# Patient Record
Sex: FEMALE | Race: ASIAN | NOT HISPANIC OR LATINO
[De-identification: names, ages, dates, MRNs, and addresses within clinical notes are randomized per-mention and may not be internally consistent; named-entity substitution may affect disease eponyms.]

---
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
## 2018-02-06 ENCOUNTER — CHARTING TRANS (OUTPATIENT)
Dept: OTHER | Age: 18
End: 2018-02-06

## 2018-11-02 VITALS
DIASTOLIC BLOOD PRESSURE: 78 MMHG | HEIGHT: 68 IN | BODY MASS INDEX: 35.16 KG/M2 | WEIGHT: 232 LBS | TEMPERATURE: 98.1 F | SYSTOLIC BLOOD PRESSURE: 110 MMHG | RESPIRATION RATE: 14 BRPM | HEART RATE: 92 BPM

## 2018-11-02 VITALS
WEIGHT: 235 LBS | RESPIRATION RATE: 18 BRPM | TEMPERATURE: 98.2 F | OXYGEN SATURATION: 98 % | SYSTOLIC BLOOD PRESSURE: 112 MMHG | DIASTOLIC BLOOD PRESSURE: 76 MMHG | HEART RATE: 102 BPM

## 2018-11-03 VITALS — RESPIRATION RATE: 17 BRPM | WEIGHT: 238 LBS | HEIGHT: 68 IN | BODY MASS INDEX: 36.07 KG/M2 | HEART RATE: 80 BPM

## 2018-11-03 VITALS
HEART RATE: 95 BPM | BODY MASS INDEX: 36.07 KG/M2 | OXYGEN SATURATION: 99 % | SYSTOLIC BLOOD PRESSURE: 120 MMHG | RESPIRATION RATE: 17 BRPM | HEIGHT: 68 IN | WEIGHT: 238 LBS | DIASTOLIC BLOOD PRESSURE: 80 MMHG

## 2018-11-27 VITALS
SYSTOLIC BLOOD PRESSURE: 118 MMHG | DIASTOLIC BLOOD PRESSURE: 78 MMHG | RESPIRATION RATE: 16 BRPM | OXYGEN SATURATION: 98 % | HEART RATE: 81 BPM | TEMPERATURE: 97.8 F

## 2019-01-22 ENCOUNTER — WALK IN (OUTPATIENT)
Dept: URGENT CARE | Age: 19
End: 2019-01-22

## 2019-01-22 DIAGNOSIS — S06.0X0A CONCUSSION WITHOUT LOSS OF CONSCIOUSNESS, INITIAL ENCOUNTER: Primary | ICD-10-CM

## 2019-01-22 DIAGNOSIS — R25.1 SHAKY: ICD-10-CM

## 2019-01-22 PROCEDURE — 99214 OFFICE O/P EST MOD 30 MIN: CPT | Performed by: FAMILY MEDICINE

## 2019-01-22 ASSESSMENT — PAIN SCALES - GENERAL: PAINLEVEL: 7-8

## 2019-02-27 ENCOUNTER — WALK IN (OUTPATIENT)
Dept: URGENT CARE | Age: 19
End: 2019-02-27

## 2019-02-27 DIAGNOSIS — J00 ACUTE NASOPHARYNGITIS: Primary | ICD-10-CM

## 2019-02-27 LAB
INTERNAL PROCEDURAL CONTROLS ACCEPTABLE: YES
S PYO AG THROAT QL IA.RAPID: NEGATIVE

## 2019-02-27 PROCEDURE — 87880 STREP A ASSAY W/OPTIC: CPT | Performed by: NURSE PRACTITIONER

## 2019-02-27 PROCEDURE — 99203 OFFICE O/P NEW LOW 30 MIN: CPT | Performed by: NURSE PRACTITIONER

## 2019-02-27 ASSESSMENT — PAIN SCALES - GENERAL: PAINLEVEL: 5-6

## 2019-03-20 DIAGNOSIS — Z01.84 IMMUNITY TO MEASLES, MUMPS, AND RUBELLA DETERMINED BY SEROLOGIC TEST: ICD-10-CM

## 2019-03-20 DIAGNOSIS — Z01.84 LACK OF IMMUNITY TO HEPATITIS B VIRUS DEMONSTRATED BY SEROLOGIC TEST: ICD-10-CM

## 2019-03-20 DIAGNOSIS — C91.01 ACUTE LYMPHOBLASTIC LEUKEMIA (ALL) IN REMISSION (CMD): Primary | ICD-10-CM

## 2019-03-20 DIAGNOSIS — Z92.21 HISTORY OF ANTINEOPLASTIC CHEMOTHERAPY: ICD-10-CM

## 2019-03-26 DIAGNOSIS — Z92.21 HISTORY OF ANTINEOPLASTIC CHEMOTHERAPY: ICD-10-CM

## 2019-03-26 DIAGNOSIS — R79.89 LOW VITAMIN D LEVEL: ICD-10-CM

## 2019-03-26 DIAGNOSIS — C91.01 ACUTE LYMPHOBLASTIC LEUKEMIA (ALL) IN REMISSION (CMD): Primary | ICD-10-CM

## 2019-03-26 DIAGNOSIS — Z92.241 PERSONAL HISTORY OF SYSTEMIC STEROID THERAPY: ICD-10-CM

## 2019-03-26 DIAGNOSIS — Z79.899 ENCOUNTER FOR MONITORING CARDIOTOXIC DRUG THERAPY: ICD-10-CM

## 2019-03-26 DIAGNOSIS — Z51.81 ENCOUNTER FOR MONITORING CARDIOTOXIC DRUG THERAPY: ICD-10-CM

## 2019-04-08 DIAGNOSIS — Z92.241 PERSONAL HISTORY OF SYSTEMIC STEROID THERAPY: ICD-10-CM

## 2019-04-08 DIAGNOSIS — Z92.21 HISTORY OF ANTINEOPLASTIC THERAPY: ICD-10-CM

## 2019-04-08 DIAGNOSIS — C91.01 ACUTE LYMPHOBLASTIC LEUKEMIA (ALL) IN REMISSION (CMD): Primary | ICD-10-CM

## 2019-09-21 ENCOUNTER — LAB SERVICES (OUTPATIENT)
Dept: LAB | Age: 19
End: 2019-09-21

## 2019-09-21 DIAGNOSIS — R79.89 LOW VITAMIN D LEVEL: ICD-10-CM

## 2019-09-21 DIAGNOSIS — C91.01 ACUTE LYMPHOBLASTIC LEUKEMIA (ALL) IN REMISSION (CMD): Primary | ICD-10-CM

## 2019-09-21 DIAGNOSIS — Z92.241 PERSONAL HISTORY OF SYSTEMIC STEROID THERAPY: ICD-10-CM

## 2019-09-21 DIAGNOSIS — Z79.899 ENCOUNTER FOR MONITORING CARDIOTOXIC DRUG THERAPY: ICD-10-CM

## 2019-09-21 DIAGNOSIS — Z92.21 HISTORY OF ANTINEOPLASTIC CHEMOTHERAPY: ICD-10-CM

## 2019-09-21 DIAGNOSIS — Z51.81 ENCOUNTER FOR MONITORING CARDIOTOXIC DRUG THERAPY: ICD-10-CM

## 2019-09-21 LAB
25(OH)D3+25(OH)D2 SERPL-MCNC: 10 NG/ML (ref 30–100)
APPEARANCE UR: CLEAR
BACTERIA #/AREA URNS HPF: ABNORMAL /HPF
BASOPHILS # BLD AUTO: 0 K/MCL (ref 0–0.3)
BASOPHILS NFR BLD AUTO: 1 %
BILIRUB UR QL STRIP: NEGATIVE
CHOLEST SERPL-MCNC: 143 MG/DL
CHOLEST/HDLC SERPL: 3.4 {RATIO}
COLOR UR: YELLOW
DIFFERENTIAL METHOD BLD: ABNORMAL
EOSINOPHIL # BLD AUTO: 0.1 K/MCL (ref 0.1–0.5)
EOSINOPHIL NFR SPEC: 1 %
ERYTHROCYTE [DISTWIDTH] IN BLOOD: 13.6 % (ref 11–15)
GLUCOSE UR STRIP-MCNC: NEGATIVE MG/DL
HBA1C MFR BLD: 5.2 % (ref 4.5–5.6)
HCT VFR BLD CALC: 36.1 % (ref 36–46.5)
HDLC SERPL-MCNC: 42 MG/DL
HGB BLD-MCNC: 11.5 G/DL (ref 12–15.5)
HGB UR QL STRIP: ABNORMAL
HYALINE CASTS #/AREA URNS LPF: ABNORMAL /LPF (ref 0–5)
IMM GRANULOCYTES # BLD AUTO: 0 K/MCL (ref 0–0.2)
IMM GRANULOCYTES NFR BLD: 0 %
KETONES UR STRIP-MCNC: NEGATIVE MG/DL
LDLC SERPL-MCNC: 91 MG/DL
LENGTH OF FAST TIME PATIENT: 12 HRS
LEUKOCYTE ESTERASE UR QL STRIP: NEGATIVE
LYMPHOCYTES # BLD MANUAL: 1.9 K/MCL (ref 1–4.8)
LYMPHOCYTES NFR BLD MANUAL: 41 %
MAGNESIUM SERPL-MCNC: 2 MG/DL (ref 1.7–2.4)
MCH RBC QN AUTO: 27.6 PG (ref 26–34)
MCHC RBC AUTO-ENTMCNC: 31.9 G/DL (ref 32–36.5)
MCV RBC AUTO: 86.8 FL (ref 78–100)
MONOCYTES # BLD MANUAL: 0.3 K/MCL (ref 0.3–0.9)
MONOCYTES NFR BLD MANUAL: 6 %
MUCOUS THREADS URNS QL MICRO: PRESENT
NEUTROPHILS # BLD: 2.3 K/MCL (ref 1.8–7.7)
NEUTROPHILS NFR BLD AUTO: 51 %
NITRITE UR QL STRIP: NEGATIVE
NONHDLC SERPL-MCNC: 101 MG/DL
NRBC BLD MANUAL-RTO: 0 /100 WBC
NT-PROBNP SERPL-MCNC: 31 PG/ML
PH UR STRIP: 6 UNITS (ref 5–7)
PHOSPHATE SERPL-MCNC: 3.4 MG/DL (ref 2.4–4.7)
PLATELET # BLD: 245 K/MCL (ref 140–450)
PROT UR STRIP-MCNC: NEGATIVE MG/DL
RBC # BLD: 4.16 MIL/MCL (ref 4–5.2)
RBC #/AREA URNS HPF: ABNORMAL /HPF (ref 0–2)
SP GR UR STRIP: 1.02 (ref 1–1.03)
SPECIMEN SOURCE: ABNORMAL
SQUAMOUS #/AREA URNS HPF: ABNORMAL /HPF (ref 0–5)
TRIGL SERPL-MCNC: 50 MG/DL
TSH SERPL-ACNC: 1.52 MCUNITS/ML (ref 0.35–5)
UROBILINOGEN UR STRIP-MCNC: 0.2 MG/DL (ref 0–1)
WBC # BLD: 4.6 K/MCL (ref 4.2–11)
WBC #/AREA URNS HPF: ABNORMAL /HPF (ref 0–5)

## 2019-09-21 PROCEDURE — 86735 MUMPS ANTIBODY: CPT | Performed by: PEDIATRICS

## 2019-09-21 PROCEDURE — 84100 ASSAY OF PHOSPHORUS: CPT | Performed by: PEDIATRICS

## 2019-09-21 PROCEDURE — 86706 HEP B SURFACE ANTIBODY: CPT | Performed by: PEDIATRICS

## 2019-09-21 PROCEDURE — 86762 RUBELLA ANTIBODY: CPT | Performed by: PEDIATRICS

## 2019-09-21 PROCEDURE — 83880 ASSAY OF NATRIURETIC PEPTIDE: CPT | Performed by: PEDIATRICS

## 2019-09-21 PROCEDURE — 84443 ASSAY THYROID STIM HORMONE: CPT | Performed by: PEDIATRICS

## 2019-09-21 PROCEDURE — 83036 HEMOGLOBIN GLYCOSYLATED A1C: CPT | Performed by: PEDIATRICS

## 2019-09-21 PROCEDURE — 85025 COMPLETE CBC W/AUTO DIFF WBC: CPT | Performed by: PEDIATRICS

## 2019-09-21 PROCEDURE — 80061 LIPID PANEL: CPT | Performed by: PEDIATRICS

## 2019-09-21 PROCEDURE — 81001 URINALYSIS AUTO W/SCOPE: CPT | Performed by: PEDIATRICS

## 2019-09-21 PROCEDURE — 82306 VITAMIN D 25 HYDROXY: CPT | Performed by: PEDIATRICS

## 2019-09-21 PROCEDURE — 83735 ASSAY OF MAGNESIUM: CPT | Performed by: PEDIATRICS

## 2019-09-21 PROCEDURE — 36415 COLL VENOUS BLD VENIPUNCTURE: CPT | Performed by: PEDIATRICS

## 2019-09-23 ENCOUNTER — LAB SERVICES (OUTPATIENT)
Dept: LAB | Age: 19
End: 2019-09-23

## 2019-09-23 DIAGNOSIS — Z01.84 LACK OF IMMUNITY TO HEPATITIS B VIRUS DEMONSTRATED BY SEROLOGIC TEST: ICD-10-CM

## 2019-09-23 DIAGNOSIS — Z92.21 HISTORY OF ANTINEOPLASTIC CHEMOTHERAPY: ICD-10-CM

## 2019-09-23 DIAGNOSIS — Z01.84 IMMUNITY TO MEASLES, MUMPS, AND RUBELLA DETERMINED BY SEROLOGIC TEST: ICD-10-CM

## 2019-09-23 DIAGNOSIS — C91.01 ACUTE LYMPHOBLASTIC LEUKEMIA (ALL) IN REMISSION (CMD): ICD-10-CM

## 2019-09-23 LAB
HBV SURFACE AB SER QL: POSITIVE
RUBV IGG SERPL IA-ACNC: 3.3 UNITS/ML

## 2019-09-23 PROCEDURE — 36415 COLL VENOUS BLD VENIPUNCTURE: CPT | Performed by: PEDIATRICS

## 2019-09-23 PROCEDURE — 80053 COMPREHEN METABOLIC PANEL: CPT | Performed by: PEDIATRICS

## 2019-09-23 PROCEDURE — 86762 RUBELLA ANTIBODY: CPT | Performed by: PEDIATRICS

## 2019-09-23 PROCEDURE — 86765 RUBEOLA ANTIBODY: CPT | Performed by: PEDIATRICS

## 2019-09-23 PROCEDURE — 86735 MUMPS ANTIBODY: CPT | Performed by: PEDIATRICS

## 2019-09-23 PROCEDURE — 86706 HEP B SURFACE ANTIBODY: CPT | Performed by: PEDIATRICS

## 2019-09-24 ENCOUNTER — OFFICE VISIT (OUTPATIENT)
Dept: HEMATOLOGY/ONCOLOGY | Age: 19
End: 2019-09-24

## 2019-09-24 VITALS
RESPIRATION RATE: 18 BRPM | TEMPERATURE: 98.2 F | SYSTOLIC BLOOD PRESSURE: 127 MMHG | HEIGHT: 67 IN | BODY MASS INDEX: 35.29 KG/M2 | WEIGHT: 224.87 LBS | DIASTOLIC BLOOD PRESSURE: 82 MMHG | HEART RATE: 86 BPM

## 2019-09-24 DIAGNOSIS — J35.8 TONSIL ASYMMETRY: ICD-10-CM

## 2019-09-24 DIAGNOSIS — R51.9 FREQUENT HEADACHES: ICD-10-CM

## 2019-09-24 DIAGNOSIS — M25.561 PAIN IN BOTH KNEES, UNSPECIFIED CHRONICITY: ICD-10-CM

## 2019-09-24 DIAGNOSIS — E55.9 VITAMIN D DEFICIENCY: ICD-10-CM

## 2019-09-24 DIAGNOSIS — I42.7 CARDIOMYOPATHY DUE TO ANTHRACYCLINE (CMD): ICD-10-CM

## 2019-09-24 DIAGNOSIS — Z23 NEED FOR PROPHYLACTIC VACCINATION WITH MEASLES-MUMPS-RUBELLA (MMR) VACCINE: ICD-10-CM

## 2019-09-24 DIAGNOSIS — Z92.21 PERSONAL HISTORY OF ANTINEOPLASTIC CHEMOTHERAPY: ICD-10-CM

## 2019-09-24 DIAGNOSIS — R25.2 CRAMPING OF HANDS: ICD-10-CM

## 2019-09-24 DIAGNOSIS — M25.562 PAIN IN BOTH KNEES, UNSPECIFIED CHRONICITY: ICD-10-CM

## 2019-09-24 DIAGNOSIS — Z92.241 PERSONAL HISTORY OF SYSTEMIC STEROID THERAPY: ICD-10-CM

## 2019-09-24 DIAGNOSIS — T45.1X5A CARDIOMYOPATHY DUE TO ANTHRACYCLINE (CMD): ICD-10-CM

## 2019-09-24 DIAGNOSIS — I73.00 RAYNAUD'S PHENOMENON WITHOUT GANGRENE: ICD-10-CM

## 2019-09-24 DIAGNOSIS — Z87.42 HISTORY OF MENSTRUAL CRAMPS: ICD-10-CM

## 2019-09-24 DIAGNOSIS — C91.01 ACUTE LYMPHOBLASTIC LEUKEMIA (ALL) IN REMISSION (CMD): Primary | ICD-10-CM

## 2019-09-24 LAB
ALBUMIN SERPL-MCNC: 3.5 G/DL (ref 3.6–5.1)
ALBUMIN/GLOB SERPL: 0.9 {RATIO} (ref 1–2.4)
ALP SERPL-CCNC: 74 UNITS/L (ref 45–117)
ALT SERPL-CCNC: 14 UNITS/L
ANION GAP SERPL CALC-SCNC: 11 MMOL/L (ref 10–20)
AST SERPL-CCNC: 14 UNITS/L
BILIRUB SERPL-MCNC: 0.4 MG/DL (ref 0.2–1)
BUN SERPL-MCNC: 16 MG/DL (ref 6–20)
BUN/CREAT SERPL: 27 (ref 7–25)
CALCIUM SERPL-MCNC: 9.1 MG/DL (ref 8.4–10.2)
CHLORIDE SERPL-SCNC: 110 MMOL/L (ref 98–107)
CO2 SERPL-SCNC: 22 MMOL/L (ref 21–32)
CREAT SERPL-MCNC: 0.59 MG/DL (ref 0.51–0.95)
FASTING STATUS PATIENT QL REPORTED: 12 HRS
GLOBULIN SER-MCNC: 3.8 G/DL (ref 2–4)
GLUCOSE SERPL-MCNC: 83 MG/DL (ref 65–99)
HBV SURFACE AB SER QL: POSITIVE
MUV IGG SER IA-ACNC: 0.63 OD RATIO
MUV IGG SER IA-ACNC: 0.72 OD RATIO
POTASSIUM SERPL-SCNC: 4.3 MMOL/L (ref 3.4–5.1)
PROT SERPL-MCNC: 7.3 G/DL (ref 6.4–8.2)
RUBV IGG SERPL IA-ACNC: 5.2 UNITS/ML
SODIUM SERPL-SCNC: 139 MMOL/L (ref 135–145)

## 2019-09-24 PROCEDURE — 99215 OFFICE O/P EST HI 40 MIN: CPT | Performed by: PEDIATRICS

## 2019-09-24 RX ORDER — CHOLECALCIFEROL (VITAMIN D3) 125 MCG
2000 CAPSULE ORAL DAILY
Qty: 30 TABLET | Refills: 11 | Status: SHIPPED | OUTPATIENT
Start: 2019-09-24 | End: 2023-10-03 | Stop reason: ALTCHOICE

## 2019-09-24 SDOH — HEALTH STABILITY: MENTAL HEALTH: HOW OFTEN DO YOU HAVE A DRINK CONTAINING ALCOHOL?: NEVER

## 2019-09-24 ASSESSMENT — ENCOUNTER SYMPTOMS
ABDOMINAL PAIN: 0
GASTROINTESTINAL NEGATIVE: 1
NUMBNESS: 1
PSYCHIATRIC NEGATIVE: 1
ALLERGIC/IMMUNOLOGIC NEGATIVE: 1
BLOOD IN STOOL: 0
RESPIRATORY NEGATIVE: 1
HEMATOLOGIC/LYMPHATIC NEGATIVE: 1
CONSTITUTIONAL NEGATIVE: 1
EYES NEGATIVE: 1
DIZZINESS: 1
HEADACHES: 1

## 2019-09-25 DIAGNOSIS — Z92.241 PERSONAL HISTORY OF SYSTEMIC STEROID THERAPY: ICD-10-CM

## 2019-09-25 DIAGNOSIS — R79.89 LOW VITAMIN D LEVEL: ICD-10-CM

## 2019-09-25 DIAGNOSIS — Z51.81 ENCOUNTER FOR MONITORING CARDIOTOXIC DRUG THERAPY: ICD-10-CM

## 2019-09-25 DIAGNOSIS — C91.01 ACUTE LYMPHOBLASTIC LEUKEMIA (ALL) IN REMISSION (CMD): Primary | ICD-10-CM

## 2019-09-25 DIAGNOSIS — Z79.899 ENCOUNTER FOR MONITORING CARDIOTOXIC DRUG THERAPY: ICD-10-CM

## 2019-09-25 DIAGNOSIS — Z92.21 PERSONAL HISTORY OF ANTINEOPLASTIC CHEMOTHERAPY: ICD-10-CM

## 2019-09-25 LAB — MEV IGG SER IA-ACNC: ABNORMAL

## 2019-10-02 ENCOUNTER — TELEPHONE (OUTPATIENT)
Dept: HEMATOLOGY/ONCOLOGY | Age: 19
End: 2019-10-02

## 2019-11-14 ENCOUNTER — TELEPHONE (OUTPATIENT)
Dept: HEMATOLOGY/ONCOLOGY | Age: 19
End: 2019-11-14

## 2019-12-21 ENCOUNTER — WALK IN (OUTPATIENT)
Dept: URGENT CARE | Age: 19
End: 2019-12-21

## 2019-12-21 VITALS
BODY MASS INDEX: 34.1 KG/M2 | HEART RATE: 100 BPM | TEMPERATURE: 99.9 F | HEIGHT: 68 IN | RESPIRATION RATE: 18 BRPM | SYSTOLIC BLOOD PRESSURE: 102 MMHG | WEIGHT: 225 LBS | DIASTOLIC BLOOD PRESSURE: 78 MMHG

## 2019-12-21 DIAGNOSIS — J30.9 ALLERGIC RHINITIS, UNSPECIFIED SEASONALITY, UNSPECIFIED TRIGGER: ICD-10-CM

## 2019-12-21 DIAGNOSIS — J00 ACUTE NASOPHARYNGITIS: Primary | ICD-10-CM

## 2019-12-21 PROCEDURE — 99213 OFFICE O/P EST LOW 20 MIN: CPT | Performed by: NURSE PRACTITIONER

## 2019-12-21 RX ORDER — FLUTICASONE PROPIONATE 50 MCG
2 SPRAY, SUSPENSION (ML) NASAL DAILY
Qty: 16 G | Refills: 0 | Status: SHIPPED | OUTPATIENT
Start: 2019-12-21 | End: 2020-11-25 | Stop reason: ALTCHOICE

## 2019-12-21 SDOH — HEALTH STABILITY: MENTAL HEALTH: HOW OFTEN DO YOU HAVE A DRINK CONTAINING ALCOHOL?: NEVER

## 2019-12-21 ASSESSMENT — ENCOUNTER SYMPTOMS
COUGH: 1
EYES NEGATIVE: 1
WHEEZING: 0
HEADACHES: 1
SHORTNESS OF BREATH: 0
DIZZINESS: 0
GASTROINTESTINAL NEGATIVE: 1
ALLERGIC/IMMUNOLOGIC NEGATIVE: 1

## 2019-12-22 ENCOUNTER — TELEPHONE (OUTPATIENT)
Dept: SCHEDULING | Age: 19
End: 2019-12-22

## 2020-06-30 ENCOUNTER — OFFICE VISIT (OUTPATIENT)
Dept: OBGYN | Age: 20
End: 2020-06-30

## 2020-06-30 DIAGNOSIS — L03.317 CELLULITIS OF BUTTOCK: Primary | ICD-10-CM

## 2020-06-30 PROCEDURE — 87205 SMEAR GRAM STAIN: CPT | Performed by: OBSTETRICS & GYNECOLOGY

## 2020-06-30 PROCEDURE — 87075 CULTR BACTERIA EXCEPT BLOOD: CPT | Performed by: OBSTETRICS & GYNECOLOGY

## 2020-06-30 PROCEDURE — 87070 CULTURE OTHR SPECIMN AEROBIC: CPT | Performed by: OBSTETRICS & GYNECOLOGY

## 2020-06-30 PROCEDURE — 99203 OFFICE O/P NEW LOW 30 MIN: CPT | Performed by: OBSTETRICS & GYNECOLOGY

## 2020-06-30 RX ORDER — SULFAMETHOXAZOLE AND TRIMETHOPRIM 800; 160 MG/1; MG/1
1 TABLET ORAL 2 TIMES DAILY
Qty: 20 TABLET | Refills: 0 | Status: SHIPPED | OUTPATIENT
Start: 2020-06-30 | End: 2020-07-10

## 2020-06-30 SDOH — HEALTH STABILITY: MENTAL HEALTH: HOW OFTEN DO YOU HAVE A DRINK CONTAINING ALCOHOL?: NEVER

## 2020-06-30 ASSESSMENT — PAIN SCALES - GENERAL: PAINLEVEL: 5-6

## 2020-07-03 LAB — FINAL REPORT: NORMAL

## 2020-07-06 LAB — FINAL REPORT: NORMAL

## 2020-07-11 ENCOUNTER — E-ADVICE (OUTPATIENT)
Dept: OBGYN | Age: 20
End: 2020-07-11

## 2020-07-13 ENCOUNTER — OFFICE VISIT (OUTPATIENT)
Dept: OBGYN | Age: 20
End: 2020-07-13

## 2020-07-13 ENCOUNTER — APPOINTMENT (OUTPATIENT)
Dept: OBGYN | Age: 20
End: 2020-07-13

## 2020-07-13 DIAGNOSIS — L03.317 CELLULITIS OF BUTTOCK, LEFT: Primary | ICD-10-CM

## 2020-07-13 PROCEDURE — 99214 OFFICE O/P EST MOD 30 MIN: CPT | Performed by: NURSE PRACTITIONER

## 2020-07-13 RX ORDER — CEPHALEXIN 500 MG/1
500 CAPSULE ORAL 2 TIMES DAILY
Qty: 14 CAPSULE | Refills: 0 | Status: SHIPPED | OUTPATIENT
Start: 2020-07-13 | End: 2020-10-12 | Stop reason: ALTCHOICE

## 2020-07-13 SDOH — HEALTH STABILITY: MENTAL HEALTH: HOW OFTEN DO YOU HAVE A DRINK CONTAINING ALCOHOL?: NEVER

## 2020-07-13 ASSESSMENT — PAIN SCALES - GENERAL: PAINLEVEL: 3-4

## 2020-07-15 ENCOUNTER — APPOINTMENT (OUTPATIENT)
Dept: OBGYN | Age: 20
End: 2020-07-15

## 2020-10-06 ENCOUNTER — APPOINTMENT (OUTPATIENT)
Dept: OBGYN | Age: 20
End: 2020-10-06

## 2020-10-07 ENCOUNTER — APPOINTMENT (OUTPATIENT)
Dept: OBGYN | Age: 20
End: 2020-10-07

## 2020-10-12 ENCOUNTER — OFFICE VISIT (OUTPATIENT)
Dept: OBGYN | Age: 20
End: 2020-10-12

## 2020-10-12 VITALS
WEIGHT: 238.6 LBS | HEART RATE: 110 BPM | SYSTOLIC BLOOD PRESSURE: 96 MMHG | TEMPERATURE: 96.7 F | HEIGHT: 68 IN | OXYGEN SATURATION: 98 % | BODY MASS INDEX: 36.16 KG/M2 | RESPIRATION RATE: 16 BRPM | DIASTOLIC BLOOD PRESSURE: 62 MMHG

## 2020-10-12 DIAGNOSIS — L73.2 HIDRADENITIS SUPPURATIVA: ICD-10-CM

## 2020-10-12 DIAGNOSIS — L02.31 ABSCESS OF LEFT BUTTOCK: Primary | ICD-10-CM

## 2020-10-12 PROCEDURE — 99214 OFFICE O/P EST MOD 30 MIN: CPT | Performed by: NURSE PRACTITIONER

## 2020-10-12 RX ORDER — CLINDAMYCIN PHOSPHATE 10 MG/G
GEL TOPICAL 2 TIMES DAILY
Qty: 30 G | Refills: 0 | Status: SHIPPED | OUTPATIENT
Start: 2020-10-12 | End: 2023-10-03 | Stop reason: ALTCHOICE

## 2020-10-12 RX ORDER — CEPHALEXIN 500 MG/1
500 CAPSULE ORAL 2 TIMES DAILY
Qty: 14 CAPSULE | Refills: 0 | Status: SHIPPED | OUTPATIENT
Start: 2020-10-12 | End: 2020-10-19

## 2020-10-12 SDOH — HEALTH STABILITY: MENTAL HEALTH: HOW OFTEN DO YOU HAVE A DRINK CONTAINING ALCOHOL?: NEVER

## 2020-11-24 ENCOUNTER — TELEPHONE (OUTPATIENT)
Dept: OBGYN | Age: 20
End: 2020-11-24

## 2020-11-25 ENCOUNTER — OFFICE VISIT (OUTPATIENT)
Dept: OBGYN | Age: 20
End: 2020-11-25

## 2020-11-25 ENCOUNTER — APPOINTMENT (OUTPATIENT)
Dept: OBGYN | Age: 20
End: 2020-11-25

## 2020-11-25 VITALS
DIASTOLIC BLOOD PRESSURE: 56 MMHG | HEART RATE: 94 BPM | TEMPERATURE: 98.2 F | SYSTOLIC BLOOD PRESSURE: 102 MMHG | OXYGEN SATURATION: 99 % | WEIGHT: 239.6 LBS | RESPIRATION RATE: 18 BRPM | BODY MASS INDEX: 36.43 KG/M2

## 2020-11-25 DIAGNOSIS — L73.2 HIDRADENITIS SUPPURATIVA: ICD-10-CM

## 2020-11-25 DIAGNOSIS — L02.91 ABSCESS: Primary | ICD-10-CM

## 2020-11-25 PROCEDURE — 99214 OFFICE O/P EST MOD 30 MIN: CPT | Performed by: NURSE PRACTITIONER

## 2020-11-25 RX ORDER — CEPHALEXIN 500 MG/1
500 CAPSULE ORAL 2 TIMES DAILY
Qty: 14 CAPSULE | Refills: 0 | Status: SHIPPED | OUTPATIENT
Start: 2020-11-25 | End: 2020-12-02

## 2020-11-25 SDOH — HEALTH STABILITY: MENTAL HEALTH: HOW OFTEN DO YOU HAVE A DRINK CONTAINING ALCOHOL?: NEVER

## 2020-12-27 ENCOUNTER — E-ADVICE (OUTPATIENT)
Dept: OBGYN | Age: 20
End: 2020-12-27

## 2020-12-28 RX ORDER — CEPHALEXIN 500 MG/1
500 CAPSULE ORAL 2 TIMES DAILY
Qty: 14 CAPSULE | Refills: 0 | Status: SHIPPED | OUTPATIENT
Start: 2020-12-28 | End: 2021-01-04

## 2021-02-19 ENCOUNTER — WALK IN (OUTPATIENT)
Dept: URGENT CARE | Age: 21
End: 2021-02-19

## 2021-02-19 DIAGNOSIS — H61.012: Primary | ICD-10-CM

## 2021-02-19 DIAGNOSIS — L40.9 SCALP PSORIASIS: ICD-10-CM

## 2021-02-19 PROCEDURE — 99213 OFFICE O/P EST LOW 20 MIN: CPT | Performed by: NURSE PRACTITIONER

## 2021-02-19 RX ORDER — CEPHALEXIN 500 MG/1
500 CAPSULE ORAL 3 TIMES DAILY
Qty: 21 CAPSULE | Refills: 0 | Status: SHIPPED | OUTPATIENT
Start: 2021-02-19 | End: 2021-02-26

## 2021-02-19 ASSESSMENT — ENCOUNTER SYMPTOMS
FATIGUE: 0
FEVER: 0
VOMITING: 0
ABDOMINAL PAIN: 0
SHORTNESS OF BREATH: 0
COUGH: 0
NAUSEA: 0
RHINORRHEA: 0
BACK PAIN: 0
WHEEZING: 0
SORE THROAT: 0
TROUBLE SWALLOWING: 0
DIARRHEA: 0
HEADACHES: 0
APPETITE CHANGE: 0
CHILLS: 0

## 2021-02-19 ASSESSMENT — PAIN SCALES - GENERAL: PAINLEVEL: 1-2

## 2021-05-25 VITALS
RESPIRATION RATE: 16 BRPM | TEMPERATURE: 97 F | OXYGEN SATURATION: 100 % | HEART RATE: 90 BPM | BODY MASS INDEX: 43.61 KG/M2 | HEIGHT: 68 IN | OXYGEN SATURATION: 99 % | SYSTOLIC BLOOD PRESSURE: 120 MMHG | RESPIRATION RATE: 18 BRPM | WEIGHT: 235 LBS | DIASTOLIC BLOOD PRESSURE: 72 MMHG | TEMPERATURE: 100 F | BODY MASS INDEX: 35.61 KG/M2 | DIASTOLIC BLOOD PRESSURE: 80 MMHG | HEART RATE: 80 BPM | TEMPERATURE: 97.4 F | HEART RATE: 79 BPM | TEMPERATURE: 98.2 F | OXYGEN SATURATION: 100 % | DIASTOLIC BLOOD PRESSURE: 66 MMHG | DIASTOLIC BLOOD PRESSURE: 62 MMHG | HEART RATE: 86 BPM | HEART RATE: 91 BPM | TEMPERATURE: 98.5 F | RESPIRATION RATE: 18 BRPM | HEIGHT: 68 IN | RESPIRATION RATE: 18 BRPM | WEIGHT: 235 LBS | BODY MASS INDEX: 35.61 KG/M2 | SYSTOLIC BLOOD PRESSURE: 98 MMHG | SYSTOLIC BLOOD PRESSURE: 118 MMHG | RESPIRATION RATE: 16 BRPM | HEIGHT: 62 IN | WEIGHT: 237 LBS | SYSTOLIC BLOOD PRESSURE: 110 MMHG | SYSTOLIC BLOOD PRESSURE: 122 MMHG | DIASTOLIC BLOOD PRESSURE: 86 MMHG | OXYGEN SATURATION: 98 %

## 2021-06-28 ENCOUNTER — E-ADVICE (OUTPATIENT)
Dept: OBGYN | Age: 21
End: 2021-06-28

## 2021-06-29 RX ORDER — CEPHALEXIN 500 MG/1
500 CAPSULE ORAL 2 TIMES DAILY
Qty: 14 CAPSULE | Refills: 0 | Status: SHIPPED | OUTPATIENT
Start: 2021-06-29 | End: 2021-07-06

## 2021-10-27 ENCOUNTER — WALK IN (OUTPATIENT)
Dept: URGENT CARE | Age: 21
End: 2021-10-27

## 2021-10-27 VITALS
DIASTOLIC BLOOD PRESSURE: 80 MMHG | SYSTOLIC BLOOD PRESSURE: 104 MMHG | TEMPERATURE: 99.6 F | BODY MASS INDEX: 36.07 KG/M2 | HEART RATE: 76 BPM | RESPIRATION RATE: 20 BRPM | WEIGHT: 238 LBS | HEIGHT: 68 IN

## 2021-10-27 DIAGNOSIS — H00.012 HORDEOLUM EXTERNUM OF RIGHT LOWER EYELID: Primary | ICD-10-CM

## 2021-10-27 DIAGNOSIS — J30.9 ALLERGIC RHINITIS, UNSPECIFIED SEASONALITY, UNSPECIFIED TRIGGER: ICD-10-CM

## 2021-10-27 PROCEDURE — 99213 OFFICE O/P EST LOW 20 MIN: CPT | Performed by: NURSE PRACTITIONER

## 2021-10-27 RX ORDER — ERYTHROMYCIN 5 MG/G
OINTMENT OPHTHALMIC
Qty: 3.5 G | Refills: 0 | Status: SHIPPED | OUTPATIENT
Start: 2021-10-27 | End: 2022-01-11 | Stop reason: ALTCHOICE

## 2021-12-30 ENCOUNTER — WALK IN (OUTPATIENT)
Dept: URGENT CARE | Age: 21
End: 2021-12-30

## 2021-12-30 VITALS
RESPIRATION RATE: 14 BRPM | OXYGEN SATURATION: 98 % | TEMPERATURE: 97.4 F | DIASTOLIC BLOOD PRESSURE: 65 MMHG | HEART RATE: 87 BPM | SYSTOLIC BLOOD PRESSURE: 121 MMHG

## 2021-12-30 DIAGNOSIS — R05.9 COUGH: ICD-10-CM

## 2021-12-30 DIAGNOSIS — J06.9 ACUTE URI: Primary | ICD-10-CM

## 2021-12-30 LAB — SARS-COV+SARS-COV-2 AG RESP QL IA.RAPID: NOT DETECTED

## 2021-12-30 PROCEDURE — 87426 SARSCOV CORONAVIRUS AG IA: CPT | Performed by: FAMILY MEDICINE

## 2021-12-30 PROCEDURE — 99213 OFFICE O/P EST LOW 20 MIN: CPT | Performed by: FAMILY MEDICINE

## 2022-01-11 ENCOUNTER — WALK IN (OUTPATIENT)
Dept: URGENT CARE | Age: 22
End: 2022-01-11

## 2022-01-11 VITALS
SYSTOLIC BLOOD PRESSURE: 145 MMHG | OXYGEN SATURATION: 99 % | RESPIRATION RATE: 20 BRPM | TEMPERATURE: 96.6 F | DIASTOLIC BLOOD PRESSURE: 77 MMHG | WEIGHT: 235 LBS | HEART RATE: 86 BPM | BODY MASS INDEX: 35.73 KG/M2

## 2022-01-11 DIAGNOSIS — J32.9 SINUSITIS, UNSPECIFIED CHRONICITY, UNSPECIFIED LOCATION: Primary | ICD-10-CM

## 2022-01-11 DIAGNOSIS — J45.909 STABLE ASTHMA, UNSPECIFIED ASTHMA SEVERITY, UNSPECIFIED WHETHER PERSISTENT: ICD-10-CM

## 2022-01-11 DIAGNOSIS — R05.9 COUGH: ICD-10-CM

## 2022-01-11 DIAGNOSIS — U07.1 COVID-19 VIRUS INFECTION: ICD-10-CM

## 2022-01-11 PROCEDURE — 99214 OFFICE O/P EST MOD 30 MIN: CPT | Performed by: FAMILY MEDICINE

## 2022-01-11 RX ORDER — METHYLPREDNISOLONE 4 MG
TABLET, DOSE PACK ORAL
Qty: 21 TABLET | Refills: 0 | Status: SHIPPED | OUTPATIENT
Start: 2022-01-11 | End: 2022-01-17

## 2022-01-11 RX ORDER — AMOXICILLIN AND CLAVULANATE POTASSIUM 875; 125 MG/1; MG/1
1 TABLET, FILM COATED ORAL 2 TIMES DAILY
Qty: 20 TABLET | Refills: 0 | Status: SHIPPED | OUTPATIENT
Start: 2022-01-11 | End: 2022-01-21

## 2022-03-22 ENCOUNTER — OFFICE VISIT (OUTPATIENT)
Dept: FAMILY MEDICINE | Age: 22
End: 2022-03-22

## 2022-03-22 VITALS
BODY MASS INDEX: 36.17 KG/M2 | WEIGHT: 244.2 LBS | DIASTOLIC BLOOD PRESSURE: 84 MMHG | HEIGHT: 69 IN | SYSTOLIC BLOOD PRESSURE: 110 MMHG | TEMPERATURE: 97.5 F | OXYGEN SATURATION: 99 % | HEART RATE: 86 BPM | RESPIRATION RATE: 16 BRPM

## 2022-03-22 DIAGNOSIS — J45.30 MILD PERSISTENT ASTHMA WITHOUT COMPLICATION: ICD-10-CM

## 2022-03-22 DIAGNOSIS — R63.5 WEIGHT GAIN, ABNORMAL: Primary | ICD-10-CM

## 2022-03-22 DIAGNOSIS — L65.9 HAIR LOSS: ICD-10-CM

## 2022-03-22 DIAGNOSIS — C91.01 ACUTE LYMPHOBLASTIC LEUKEMIA (ALL) IN REMISSION (CMD): ICD-10-CM

## 2022-03-22 DIAGNOSIS — J35.1 TONSILLAR ENLARGEMENT: ICD-10-CM

## 2022-03-22 DIAGNOSIS — R53.83 FATIGUE, UNSPECIFIED TYPE: ICD-10-CM

## 2022-03-22 DIAGNOSIS — Z87.42 HISTORY OF MENSTRUAL CRAMPS: ICD-10-CM

## 2022-03-22 PROBLEM — Z85.6 HISTORY OF LEUKEMIA: Status: ACTIVE | Noted: 2022-03-22

## 2022-03-22 PROBLEM — H52.13 MYOPIA OF BOTH EYES: Status: ACTIVE | Noted: 2022-03-22

## 2022-03-22 PROCEDURE — 99204 OFFICE O/P NEW MOD 45 MIN: CPT | Performed by: FAMILY MEDICINE

## 2022-03-22 ASSESSMENT — ENCOUNTER SYMPTOMS
ADENOPATHY: 0
FATIGUE: 0
ENDOCRINE COMMENTS: HAIR LOSS
SORE THROAT: 0
WOUND: 0
COUGH: 0
SHORTNESS OF BREATH: 1
CHILLS: 0
ABDOMINAL PAIN: 0
SINUS PAIN: 0
DIARRHEA: 0
HEADACHES: 0
HALLUCINATIONS: 0
FEVER: 0
UNEXPECTED WEIGHT CHANGE: 1
RHINORRHEA: 0
TROUBLE SWALLOWING: 0
FACIAL ASYMMETRY: 0
DIZZINESS: 0
WHEEZING: 1
BACK PAIN: 0
CHEST TIGHTNESS: 1
NAUSEA: 0
SEIZURES: 0
VOMITING: 0
CONSTIPATION: 0
ABDOMINAL DISTENTION: 0
ACTIVITY CHANGE: 0
NERVOUS/ANXIOUS: 0
BLOOD IN STOOL: 0
SPEECH DIFFICULTY: 0
CONFUSION: 0
SLEEP DISTURBANCE: 0
APPETITE CHANGE: 0
LIGHT-HEADEDNESS: 0
EYES NEGATIVE: 1
FACIAL SWELLING: 0
WEAKNESS: 0
SINUS PRESSURE: 0
AGITATION: 0
NUMBNESS: 0

## 2022-03-22 ASSESSMENT — PATIENT HEALTH QUESTIONNAIRE - PHQ9
1. LITTLE INTEREST OR PLEASURE IN DOING THINGS: NOT AT ALL
CLINICAL INTERPRETATION OF PHQ2 SCORE: NO FURTHER SCREENING NEEDED
2. FEELING DOWN, DEPRESSED OR HOPELESS: NOT AT ALL
SUM OF ALL RESPONSES TO PHQ9 QUESTIONS 1 AND 2: 0
SUM OF ALL RESPONSES TO PHQ9 QUESTIONS 1 AND 2: 0

## 2022-04-01 ENCOUNTER — LAB REQUISITION (OUTPATIENT)
Dept: LAB | Age: 22
End: 2022-04-01

## 2022-04-01 ENCOUNTER — LAB SERVICES (OUTPATIENT)
Dept: LAB | Age: 22
End: 2022-04-01

## 2022-04-01 DIAGNOSIS — L65.9 HAIR LOSS: ICD-10-CM

## 2022-04-01 DIAGNOSIS — Z87.42 PERSONAL HISTORY OF OTHER DISEASES OF THE FEMALE GENITAL TRACT: ICD-10-CM

## 2022-04-01 DIAGNOSIS — C91.01 ACUTE LYMPHOBLASTIC LEUKEMIA (ALL) IN REMISSION (CMD): ICD-10-CM

## 2022-04-01 DIAGNOSIS — R53.83 OTHER FATIGUE: ICD-10-CM

## 2022-04-01 DIAGNOSIS — C91.01 ACUTE LYMPHOBLASTIC LEUKEMIA, IN REMISSION (CMD): ICD-10-CM

## 2022-04-01 DIAGNOSIS — L65.9 NONSCARRING HAIR LOSS, UNSPECIFIED: ICD-10-CM

## 2022-04-01 DIAGNOSIS — J45.30 MILD PERSISTENT ASTHMA, UNCOMPLICATED: ICD-10-CM

## 2022-04-01 DIAGNOSIS — R53.83 FATIGUE, UNSPECIFIED TYPE: ICD-10-CM

## 2022-04-01 DIAGNOSIS — R63.5 ABNORMAL WEIGHT GAIN: ICD-10-CM

## 2022-04-01 DIAGNOSIS — J35.1 TONSILLAR ENLARGEMENT: ICD-10-CM

## 2022-04-01 DIAGNOSIS — J35.1 HYPERTROPHY OF TONSILS: ICD-10-CM

## 2022-04-01 DIAGNOSIS — R63.5 WEIGHT GAIN, ABNORMAL: Primary | ICD-10-CM

## 2022-04-01 DIAGNOSIS — J45.30 MILD PERSISTENT ASTHMA WITHOUT COMPLICATION: ICD-10-CM

## 2022-04-01 DIAGNOSIS — Z87.42 HISTORY OF MENSTRUAL CRAMPS: ICD-10-CM

## 2022-04-01 LAB
25(OH)D3 SERPL-MCNC: 30 NG/ML (ref 30–100)
ALBUMIN SERPL-MCNC: 3.8 G/DL (ref 3.6–5.1)
ALP SERPL-CCNC: 69 U/L (ref 45–130)
ALT SERPL W/O P-5'-P-CCNC: 18 U/L (ref 4–38)
AST SERPL-CCNC: 32 U/L (ref 14–43)
BASOPHIL %: 0.6 % (ref 0–1.2)
BASOPHIL ABSOLUTE #: 0 10*3/UL (ref 0–0.1)
BILIRUB SERPL-MCNC: 0.4 MG/DL (ref 0–1.3)
BUN SERPL-MCNC: 18 MG/DL (ref 7–20)
CALCIUM SERPL-MCNC: 9.2 MG/DL (ref 8.6–10.6)
CHLORIDE SERPL-SCNC: 104 MMOL/L (ref 96–107)
CHOLEST SERPL-MCNC: 151 MG/DL (ref 140–200)
CO2 SERPL-SCNC: 27 MMOL/L (ref 22–32)
CREAT SERPL-MCNC: 0.8 MG/DL (ref 0.5–1.4)
DIFFERENTIAL TYPE: ABNORMAL
EOSINOPHIL %: 1.5 % (ref 0–10)
EOSINOPHIL ABSOLUTE #: 0.1 10*3/UL (ref 0–0.5)
EST. AVERAGE GLUCOSE BLD GHB EST-MCNC: 110 MG/DL (ref 0–154)
ESTRADIOL SERPL-MCNC: 58.1 PG/ML
FOLATE SERPL-MCNC: 12 NG/ML (ref 3–17)
FSH SERPL-ACNC: 5.12 M[IU]/ML (ref 2–25)
GFR SERPL CREATININE-BSD FRML MDRD: >60 ML/MIN/{1.73M2}
GFR SERPL CREATININE-BSD FRML MDRD: >60 ML/MIN/{1.73M2}
GLUCOSE P FAST SERPL-MCNC: 97 MG/DL (ref 60–100)
HBA1C MFR BLD: 5.5 % (ref 4.2–6)
HDLC SERPL-MCNC: 45 MG/DL
HEMATOCRIT: 34.8 % (ref 34–45)
HEMOGLOBIN: 10.9 G/DL (ref 11.2–15.7)
IMMATURE GRANULOCYTE ABSOLUTE: 0.01 10*3/UL (ref 0–0.05)
IMMATURE GRANULOCYTE PERCENT: 0.2 % (ref 0–0.5)
LDLC SERPL CALC-MCNC: 96 MG/DL (ref 30–100)
LH SERPL-ACNC: 4.12 M[IU]/ML
LYMPH PERCENT: 37 % (ref 20.5–51.1)
LYMPHOCYTE ABSOLUTE #: 1.8 10*3/UL (ref 1.2–3.4)
MEAN CORPUSCULAR HGB CONCENTRATION: 31.3 % (ref 32–36)
MEAN CORPUSCULAR HGB: 26.4 PG (ref 27–34)
MEAN CORPUSCULAR VOLUME: 84.3 FL (ref 79–95)
MEAN PLATELET VOLUME: 10.4 FL (ref 8.6–12.4)
MONOCYTE ABSOLUTE #: 0.3 10*3/UL (ref 0.2–0.9)
MONOCYTE PERCENT: 6.7 % (ref 4.3–12.9)
NEUTROPHIL ABSOLUTE #: 2.6 10*3/UL (ref 1.4–6.5)
NEUTROPHIL PERCENT: 54 % (ref 34–73.5)
PLATELET COUNT: 286 10*3/UL (ref 150–400)
POTASSIUM SERPL-SCNC: 4.5 MMOL/L (ref 3.5–5.3)
PROT SERPL-MCNC: 7.6 G/DL (ref 6.4–8.5)
RED BLOOD CELL COUNT: 4.13 10*6/UL (ref 3.7–5.2)
RED CELL DISTRIBUTION WIDTH: 14.3 % (ref 11.3–14.8)
SODIUM SERPL-SCNC: 135 MMOL/L (ref 136–146)
TRIGL SERPL-MCNC: 51 MG/DL (ref 0–200)
TSH SERPL DL<=0.05 MIU/L-ACNC: 2.47 M[IU]/L (ref 0.3–4.82)
VIT B12 SERPL-MCNC: 270 PG/ML (ref 193–982)
WHITE BLOOD CELL COUNT: 4.8 10*3/UL (ref 4–10)

## 2022-04-01 PROCEDURE — 83036 HEMOGLOBIN GLYCOSYLATED A1C: CPT | Performed by: FAMILY MEDICINE

## 2022-04-01 PROCEDURE — 84402 ASSAY OF FREE TESTOSTERONE: CPT | Performed by: CLINICAL MEDICAL LABORATORY

## 2022-04-01 PROCEDURE — 83002 ASSAY OF GONADOTROPIN (LH): CPT | Performed by: FAMILY MEDICINE

## 2022-04-01 PROCEDURE — 80050 GENERAL HEALTH PANEL: CPT | Performed by: FAMILY MEDICINE

## 2022-04-01 PROCEDURE — 80061 LIPID PANEL: CPT | Performed by: FAMILY MEDICINE

## 2022-04-01 PROCEDURE — 84402 ASSAY OF FREE TESTOSTERONE: CPT | Performed by: FAMILY MEDICINE

## 2022-04-01 PROCEDURE — PSEU9838 TESTOSTERONE, FREE, FEMALE OR CHILD: Performed by: CLINICAL MEDICAL LABORATORY

## 2022-04-01 PROCEDURE — 82607 VITAMIN B-12: CPT | Performed by: FAMILY MEDICINE

## 2022-04-01 PROCEDURE — 83001 ASSAY OF GONADOTROPIN (FSH): CPT | Performed by: FAMILY MEDICINE

## 2022-04-01 PROCEDURE — 82306 VITAMIN D 25 HYDROXY: CPT | Performed by: FAMILY MEDICINE

## 2022-04-01 PROCEDURE — 36415 COLL VENOUS BLD VENIPUNCTURE: CPT | Performed by: FAMILY MEDICINE

## 2022-04-01 PROCEDURE — 82670 ASSAY OF TOTAL ESTRADIOL: CPT | Performed by: FAMILY MEDICINE

## 2022-04-01 PROCEDURE — 82746 ASSAY OF FOLIC ACID SERUM: CPT | Performed by: FAMILY MEDICINE

## 2022-04-06 ENCOUNTER — TELEPHONE (OUTPATIENT)
Dept: FAMILY MEDICINE | Age: 22
End: 2022-04-06

## 2022-04-12 LAB
TESTOST FREE SERPL DL<=1.0 NG/DL-MCNC: 6.4 PG/ML (ref 0.8–7.4)
TESTOST FREE SERPL-MCNC: 6.4 PG/ML (ref 0.8–7.4)

## 2022-07-07 ENCOUNTER — OFFICE VISIT (OUTPATIENT)
Dept: SURGERY | Facility: CLINIC | Age: 22
End: 2022-07-07
Payer: COMMERCIAL

## 2022-07-07 VITALS
BODY MASS INDEX: 37.46 KG/M2 | HEART RATE: 105 BPM | DIASTOLIC BLOOD PRESSURE: 70 MMHG | WEIGHT: 250 LBS | HEIGHT: 68.5 IN | SYSTOLIC BLOOD PRESSURE: 112 MMHG | OXYGEN SATURATION: 99 %

## 2022-07-07 DIAGNOSIS — E66.9 OBESITY (BMI 30-39.9): ICD-10-CM

## 2022-07-07 DIAGNOSIS — Z85.6 HX OF ACUTE LYMPHOID LEUKEMIA: ICD-10-CM

## 2022-07-07 DIAGNOSIS — Z51.81 ENCOUNTER FOR THERAPEUTIC DRUG MONITORING: Primary | ICD-10-CM

## 2022-07-07 DIAGNOSIS — R63.5 WEIGHT GAIN: ICD-10-CM

## 2022-07-07 PROCEDURE — 3008F BODY MASS INDEX DOCD: CPT | Performed by: NURSE PRACTITIONER

## 2022-07-07 PROCEDURE — 3078F DIAST BP <80 MM HG: CPT | Performed by: NURSE PRACTITIONER

## 2022-07-07 PROCEDURE — 3074F SYST BP LT 130 MM HG: CPT | Performed by: NURSE PRACTITIONER

## 2022-07-07 PROCEDURE — 99204 OFFICE O/P NEW MOD 45 MIN: CPT | Performed by: NURSE PRACTITIONER

## 2022-07-07 RX ORDER — BUPROPION HYDROCHLORIDE 150 MG/1
150 TABLET ORAL DAILY
Qty: 30 TABLET | Refills: 1 | Status: SHIPPED | OUTPATIENT
Start: 2022-07-07

## 2022-07-07 NOTE — PATIENT INSTRUCTIONS
Magnesium glycinate 200 to 500 mg/day for sleep. Life Extension. NOW. Garden of Life. Pure Encapsulations. Or consider Natural Calm. by Gabby Ellis  Follow dosage instructions. Start 1 teaspoon and increase up to 3 teaspoons as needed for sleep. Complete a 3 day, 5 day, and 7 day challenge by next visit.

## 2022-07-08 ENCOUNTER — WALK IN (OUTPATIENT)
Dept: URGENT CARE | Age: 22
End: 2022-07-08

## 2022-07-08 VITALS
RESPIRATION RATE: 16 BRPM | SYSTOLIC BLOOD PRESSURE: 105 MMHG | HEART RATE: 111 BPM | DIASTOLIC BLOOD PRESSURE: 66 MMHG | TEMPERATURE: 97.8 F | OXYGEN SATURATION: 98 %

## 2022-07-08 DIAGNOSIS — Z20.822 SUSPECTED COVID-19 VIRUS INFECTION: Primary | ICD-10-CM

## 2022-07-08 DIAGNOSIS — J02.9 SORE THROAT: ICD-10-CM

## 2022-07-08 LAB
INTERNAL PROCEDURAL CONTROLS ACCEPTABLE: YES
INTERNAL PROCEDURAL CONTROLS ACCEPTABLE: YES
S PYO AG THROAT QL IA.RAPID: NEGATIVE
SARS-COV+SARS-COV-2 AG RESP QL IA.RAPID: NOT DETECTED
TEST LOT EXPIRATION DATE: NORMAL
TEST LOT EXPIRATION DATE: NORMAL
TEST LOT NUMBER: NORMAL
TEST LOT NUMBER: NORMAL

## 2022-07-08 PROCEDURE — 87426 SARSCOV CORONAVIRUS AG IA: CPT | Performed by: FAMILY MEDICINE

## 2022-07-08 PROCEDURE — 99214 OFFICE O/P EST MOD 30 MIN: CPT | Performed by: FAMILY MEDICINE

## 2022-07-08 PROCEDURE — 87880 STREP A ASSAY W/OPTIC: CPT | Performed by: FAMILY MEDICINE

## 2022-07-08 RX ORDER — AZITHROMYCIN 250 MG/1
TABLET, FILM COATED ORAL
Qty: 6 TABLET | Refills: 0 | Status: SHIPPED | OUTPATIENT
Start: 2022-07-08 | End: 2022-07-13

## 2022-07-08 ASSESSMENT — ENCOUNTER SYMPTOMS: SORE THROAT: 1

## 2022-07-08 ASSESSMENT — PAIN SCALES - GENERAL: PAINLEVEL: 6

## 2022-08-31 ENCOUNTER — OFFICE VISIT (OUTPATIENT)
Dept: INTERNAL MEDICINE CLINIC | Facility: CLINIC | Age: 22
End: 2022-08-31
Payer: COMMERCIAL

## 2022-08-31 VITALS
BODY MASS INDEX: 38.19 KG/M2 | DIASTOLIC BLOOD PRESSURE: 80 MMHG | HEIGHT: 68 IN | SYSTOLIC BLOOD PRESSURE: 120 MMHG | WEIGHT: 252 LBS | RESPIRATION RATE: 16 BRPM | HEART RATE: 78 BPM

## 2022-08-31 DIAGNOSIS — E66.9 OBESITY (BMI 30-39.9): ICD-10-CM

## 2022-08-31 DIAGNOSIS — G43.009 MIGRAINE WITHOUT AURA AND WITHOUT STATUS MIGRAINOSUS, NOT INTRACTABLE: ICD-10-CM

## 2022-08-31 DIAGNOSIS — Z85.6 HX OF ACUTE LYMPHOID LEUKEMIA: ICD-10-CM

## 2022-08-31 DIAGNOSIS — Z51.81 THERAPEUTIC DRUG MONITORING: Primary | ICD-10-CM

## 2022-08-31 PROBLEM — R25.2 CRAMPING OF HANDS: Status: ACTIVE | Noted: 2019-09-24

## 2022-08-31 PROBLEM — H93.13 BILATERAL TINNITUS: Status: ACTIVE | Noted: 2022-08-31

## 2022-08-31 PROBLEM — C91.01 ACUTE LYMPHOBLASTIC LEUKEMIA (ALL) IN REMISSION (HCC): Status: ACTIVE | Noted: 2019-09-23

## 2022-08-31 PROBLEM — T45.1X5A: Status: ACTIVE | Noted: 2019-09-24

## 2022-08-31 PROBLEM — E55.9 VITAMIN D DEFICIENCY: Status: ACTIVE | Noted: 2019-09-24

## 2022-08-31 PROBLEM — I73.00 RAYNAUD'S PHENOMENON WITHOUT GANGRENE: Status: ACTIVE | Noted: 2019-09-24

## 2022-08-31 PROBLEM — I42.7: Status: ACTIVE | Noted: 2019-09-24

## 2022-08-31 PROBLEM — H52.13 MYOPIA OF BOTH EYES: Status: ACTIVE | Noted: 2022-03-22

## 2022-08-31 PROBLEM — J45.30 MILD PERSISTENT ASTHMA WITHOUT COMPLICATION: Status: ACTIVE | Noted: 2022-03-22

## 2022-08-31 PROBLEM — R53.83 FATIGUE: Status: ACTIVE | Noted: 2022-03-22

## 2022-08-31 PROBLEM — T45.1X5A CARDIOMYOPATHY DUE TO ANTHRACYCLINE (HCC): Status: ACTIVE | Noted: 2019-09-24

## 2022-08-31 PROBLEM — R42 DIZZINESS: Status: ACTIVE | Noted: 2022-08-31

## 2022-08-31 PROBLEM — L65.9 HAIR LOSS: Status: ACTIVE | Noted: 2022-03-22

## 2022-08-31 PROBLEM — J45.30 MILD PERSISTENT ASTHMA WITHOUT COMPLICATION (HCC): Status: ACTIVE | Noted: 2022-03-22

## 2022-08-31 PROBLEM — I42.7 CARDIOMYOPATHY DUE TO ANTHRACYCLINE (HCC): Status: ACTIVE | Noted: 2019-09-24

## 2022-08-31 RX ORDER — TOPIRAMATE 25 MG/1
25 CAPSULE, EXTENDED RELEASE ORAL DAILY
Qty: 30 CAPSULE | Refills: 2 | Status: SHIPPED | OUTPATIENT
Start: 2022-08-31 | End: 2022-09-30

## 2022-11-01 ENCOUNTER — WALK IN (OUTPATIENT)
Dept: URGENT CARE | Age: 22
End: 2022-11-01

## 2022-11-01 VITALS
RESPIRATION RATE: 16 BRPM | HEART RATE: 105 BPM | SYSTOLIC BLOOD PRESSURE: 118 MMHG | DIASTOLIC BLOOD PRESSURE: 72 MMHG | TEMPERATURE: 98.6 F | OXYGEN SATURATION: 97 %

## 2022-11-01 DIAGNOSIS — R09.81 CONGESTION OF NASAL SINUS: ICD-10-CM

## 2022-11-01 DIAGNOSIS — J32.9 SINUSITIS, UNSPECIFIED CHRONICITY, UNSPECIFIED LOCATION: Primary | ICD-10-CM

## 2022-11-01 LAB
INTERNAL PROCEDURAL CONTROLS ACCEPTABLE: YES
SARS-COV+SARS-COV-2 AG RESP QL IA.RAPID: DETECTED
TEST LOT EXPIRATION DATE: ABNORMAL
TEST LOT NUMBER: ABNORMAL

## 2022-11-01 PROCEDURE — 87426 SARSCOV CORONAVIRUS AG IA: CPT | Performed by: FAMILY MEDICINE

## 2022-11-01 PROCEDURE — 99214 OFFICE O/P EST MOD 30 MIN: CPT | Performed by: FAMILY MEDICINE

## 2022-11-01 RX ORDER — AMOXICILLIN AND CLAVULANATE POTASSIUM 875; 125 MG/1; MG/1
1 TABLET, FILM COATED ORAL 2 TIMES DAILY
Qty: 20 TABLET | Refills: 0 | Status: SHIPPED | OUTPATIENT
Start: 2022-11-01 | End: 2022-11-11

## 2022-11-01 RX ORDER — PREDNISONE 20 MG/1
40 TABLET ORAL DAILY
Qty: 14 TABLET | Refills: 0 | Status: SHIPPED | OUTPATIENT
Start: 2022-11-01 | End: 2022-11-08

## 2022-12-07 ENCOUNTER — TELEPHONE (OUTPATIENT)
Dept: INTERNAL MEDICINE CLINIC | Facility: CLINIC | Age: 22
End: 2022-12-07

## 2022-12-07 ENCOUNTER — OFFICE VISIT (OUTPATIENT)
Dept: INTERNAL MEDICINE CLINIC | Facility: CLINIC | Age: 22
End: 2022-12-07
Payer: COMMERCIAL

## 2022-12-07 VITALS
DIASTOLIC BLOOD PRESSURE: 68 MMHG | SYSTOLIC BLOOD PRESSURE: 106 MMHG | RESPIRATION RATE: 18 BRPM | OXYGEN SATURATION: 96 % | HEIGHT: 68 IN | BODY MASS INDEX: 38.65 KG/M2 | WEIGHT: 255 LBS | HEART RATE: 102 BPM

## 2022-12-07 DIAGNOSIS — L73.2 HIDRADENITIS SUPPURATIVA: ICD-10-CM

## 2022-12-07 DIAGNOSIS — Z51.81 THERAPEUTIC DRUG MONITORING: Primary | ICD-10-CM

## 2022-12-07 DIAGNOSIS — E66.9 OBESITY (BMI 30-39.9): ICD-10-CM

## 2022-12-07 LAB
ATRIAL RATE: 94 BPM
P AXIS: 80 DEGREES
P-R INTERVAL: 138 MS
Q-T INTERVAL: 370 MS
QRS DURATION: 76 MS
QTC CALCULATION (BEZET): 462 MS
R AXIS: 20 DEGREES
T AXIS: 66 DEGREES
VENTRICULAR RATE: 94 BPM

## 2022-12-07 PROCEDURE — 99213 OFFICE O/P EST LOW 20 MIN: CPT | Performed by: INTERNAL MEDICINE

## 2022-12-07 PROCEDURE — 93000 ELECTROCARDIOGRAM COMPLETE: CPT | Performed by: INTERNAL MEDICINE

## 2022-12-07 PROCEDURE — 3074F SYST BP LT 130 MM HG: CPT | Performed by: INTERNAL MEDICINE

## 2022-12-07 PROCEDURE — 3008F BODY MASS INDEX DOCD: CPT | Performed by: INTERNAL MEDICINE

## 2022-12-07 PROCEDURE — 3078F DIAST BP <80 MM HG: CPT | Performed by: INTERNAL MEDICINE

## 2022-12-07 RX ORDER — TOPIRAMATE 25 MG/1
25 CAPSULE, EXTENDED RELEASE ORAL DAILY
COMMUNITY
Start: 2022-10-24 | End: 2022-12-07

## 2022-12-07 RX ORDER — MOMETASONE FUROATE 1 MG/G
OINTMENT TOPICAL
COMMUNITY
Start: 2022-10-17

## 2022-12-07 RX ORDER — AZITHROMYCIN 250 MG/1
500 TABLET, FILM COATED ORAL DAILY
COMMUNITY
Start: 2022-07-08 | End: 2022-12-07

## 2022-12-07 RX ORDER — CLINDAMYCIN PHOSPHATE 10 MG/G
1 GEL TOPICAL DAILY
COMMUNITY
Start: 2022-10-27

## 2022-12-07 RX ORDER — TOPIRAMATE 25 MG/1
25 CAPSULE, EXTENDED RELEASE ORAL DAILY
Qty: 30 CAPSULE | Refills: 0 | Status: SHIPPED | OUTPATIENT
Start: 2022-12-07

## 2022-12-09 RX ORDER — PHENTERMINE HYDROCHLORIDE 15 MG/1
15 CAPSULE ORAL EVERY MORNING
Qty: 30 CAPSULE | Refills: 1 | Status: SHIPPED | OUTPATIENT
Start: 2022-12-09

## 2022-12-13 PROBLEM — L73.2 HIDRADENITIS SUPPURATIVA: Status: ACTIVE | Noted: 2022-12-13

## 2022-12-13 RX ORDER — PHENTERMINE HYDROCHLORIDE 15 MG/1
15 CAPSULE ORAL EVERY MORNING
Qty: 30 CAPSULE | Refills: 1 | Status: SHIPPED | OUTPATIENT
Start: 2022-12-13

## 2023-02-15 RX ORDER — TOPIRAMATE 25 MG/1
25 CAPSULE, EXTENDED RELEASE ORAL DAILY
Qty: 30 CAPSULE | Refills: 0 | Status: SHIPPED | OUTPATIENT
Start: 2023-02-15

## 2023-02-15 RX ORDER — PHENTERMINE HYDROCHLORIDE 15 MG/1
15 CAPSULE ORAL EVERY MORNING
Qty: 30 CAPSULE | Refills: 1 | Status: SHIPPED | OUTPATIENT
Start: 2023-02-15

## 2023-03-16 ENCOUNTER — OFFICE VISIT (OUTPATIENT)
Dept: INTERNAL MEDICINE CLINIC | Facility: CLINIC | Age: 23
End: 2023-03-16
Payer: COMMERCIAL

## 2023-03-16 VITALS
WEIGHT: 246 LBS | SYSTOLIC BLOOD PRESSURE: 118 MMHG | DIASTOLIC BLOOD PRESSURE: 62 MMHG | HEIGHT: 68.5 IN | BODY MASS INDEX: 36.85 KG/M2 | RESPIRATION RATE: 16 BRPM | HEART RATE: 88 BPM

## 2023-03-16 DIAGNOSIS — E66.9 OBESITY (BMI 30-39.9): ICD-10-CM

## 2023-03-16 DIAGNOSIS — Z51.81 THERAPEUTIC DRUG MONITORING: Primary | ICD-10-CM

## 2023-03-16 PROCEDURE — 3008F BODY MASS INDEX DOCD: CPT | Performed by: INTERNAL MEDICINE

## 2023-03-16 PROCEDURE — 99213 OFFICE O/P EST LOW 20 MIN: CPT | Performed by: INTERNAL MEDICINE

## 2023-03-16 PROCEDURE — 3078F DIAST BP <80 MM HG: CPT | Performed by: INTERNAL MEDICINE

## 2023-03-16 PROCEDURE — 3074F SYST BP LT 130 MM HG: CPT | Performed by: INTERNAL MEDICINE

## 2023-03-16 RX ORDER — OXYBUTYNIN CHLORIDE 5 MG/1
TABLET ORAL
COMMUNITY
Start: 2023-03-06

## 2023-03-16 RX ORDER — CLINDAMYCIN AND BENZOYL PEROXIDE 10; 50 MG/G; MG/G
GEL TOPICAL
COMMUNITY
Start: 2023-03-06

## 2023-03-16 RX ORDER — RIFAMPIN 300 MG/1
300 CAPSULE ORAL 2 TIMES DAILY
COMMUNITY
Start: 2023-01-16

## 2023-03-16 RX ORDER — PHENTERMINE HYDROCHLORIDE 15 MG/1
15 CAPSULE ORAL EVERY MORNING
Qty: 30 CAPSULE | Refills: 2 | Status: SHIPPED | OUTPATIENT
Start: 2023-03-16

## 2023-03-30 NOTE — TELEPHONE ENCOUNTER
Requesting topiramate ER 25 mg   LOV: 3/16/23  RTC: 8 weeks  Last Relevant Labs: na  Filled: 2/15/23 #30 with 0 refills    No future appointments.

## 2023-03-31 RX ORDER — TOPIRAMATE 25 MG/1
25 CAPSULE, EXTENDED RELEASE ORAL DAILY
Qty: 30 CAPSULE | Refills: 2 | Status: SHIPPED | OUTPATIENT
Start: 2023-03-31

## 2023-05-22 ENCOUNTER — WALK IN (OUTPATIENT)
Dept: URGENT CARE | Age: 23
End: 2023-05-22

## 2023-05-22 VITALS
HEART RATE: 90 BPM | WEIGHT: 240 LBS | SYSTOLIC BLOOD PRESSURE: 124 MMHG | OXYGEN SATURATION: 98 % | RESPIRATION RATE: 18 BRPM | BODY MASS INDEX: 35.75 KG/M2 | DIASTOLIC BLOOD PRESSURE: 72 MMHG | TEMPERATURE: 99 F

## 2023-05-22 DIAGNOSIS — J02.9 SORETHROAT: ICD-10-CM

## 2023-05-22 DIAGNOSIS — J01.90 ACUTE NON-RECURRENT SINUSITIS, UNSPECIFIED LOCATION: Primary | ICD-10-CM

## 2023-05-22 LAB
INTERNAL PROCEDURAL CONTROLS ACCEPTABLE: YES
S PYO AG THROAT QL IA.RAPID: NEGATIVE
TEST LOT EXPIRATION DATE: NORMAL
TEST LOT NUMBER: NORMAL

## 2023-05-22 PROCEDURE — 99214 OFFICE O/P EST MOD 30 MIN: CPT | Performed by: FAMILY MEDICINE

## 2023-05-22 PROCEDURE — 87880 STREP A ASSAY W/OPTIC: CPT | Performed by: FAMILY MEDICINE

## 2023-05-22 RX ORDER — AMOXICILLIN 875 MG/1
875 TABLET, COATED ORAL 2 TIMES DAILY
Qty: 20 TABLET | Refills: 0 | Status: SHIPPED | OUTPATIENT
Start: 2023-05-22 | End: 2023-06-01

## 2023-05-22 ASSESSMENT — PAIN SCALES - GENERAL: PAINLEVEL: 4

## 2023-05-25 ENCOUNTER — OFFICE VISIT (OUTPATIENT)
Dept: INTERNAL MEDICINE CLINIC | Facility: CLINIC | Age: 23
End: 2023-05-25
Payer: COMMERCIAL

## 2023-05-25 VITALS
HEIGHT: 68.5 IN | WEIGHT: 245 LBS | RESPIRATION RATE: 16 BRPM | BODY MASS INDEX: 36.71 KG/M2 | SYSTOLIC BLOOD PRESSURE: 108 MMHG | DIASTOLIC BLOOD PRESSURE: 74 MMHG | HEART RATE: 100 BPM

## 2023-05-25 DIAGNOSIS — E66.9 OBESITY (BMI 30-39.9): ICD-10-CM

## 2023-05-25 DIAGNOSIS — Z51.81 THERAPEUTIC DRUG MONITORING: Primary | ICD-10-CM

## 2023-05-25 PROCEDURE — 3074F SYST BP LT 130 MM HG: CPT | Performed by: INTERNAL MEDICINE

## 2023-05-25 PROCEDURE — 99213 OFFICE O/P EST LOW 20 MIN: CPT | Performed by: INTERNAL MEDICINE

## 2023-05-25 PROCEDURE — 3078F DIAST BP <80 MM HG: CPT | Performed by: INTERNAL MEDICINE

## 2023-05-25 PROCEDURE — 3008F BODY MASS INDEX DOCD: CPT | Performed by: INTERNAL MEDICINE

## 2023-05-25 RX ORDER — AMOXICILLIN 875 MG/1
875 TABLET, COATED ORAL 2 TIMES DAILY
Qty: 20 TABLET | Refills: 0 | COMMUNITY
Start: 2023-05-22 | End: 2023-06-01

## 2023-05-25 RX ORDER — TOPIRAMATE 25 MG/1
25 CAPSULE, EXTENDED RELEASE ORAL DAILY
Qty: 90 CAPSULE | Refills: 1 | Status: SHIPPED | OUTPATIENT
Start: 2023-05-25 | End: 2023-05-25

## 2023-05-25 RX ORDER — PHENTERMINE HYDROCHLORIDE 30 MG/1
30 CAPSULE ORAL EVERY MORNING
Qty: 30 CAPSULE | Refills: 2 | Status: SHIPPED | OUTPATIENT
Start: 2023-05-25 | End: 2023-05-25

## 2023-07-02 ENCOUNTER — PATIENT MESSAGE (OUTPATIENT)
Dept: INTERNAL MEDICINE CLINIC | Facility: CLINIC | Age: 23
End: 2023-07-02

## 2023-08-29 ENCOUNTER — OFFICE VISIT (OUTPATIENT)
Dept: INTERNAL MEDICINE CLINIC | Facility: CLINIC | Age: 23
End: 2023-08-29
Payer: COMMERCIAL

## 2023-08-29 VITALS
OXYGEN SATURATION: 96 % | WEIGHT: 242 LBS | RESPIRATION RATE: 18 BRPM | HEART RATE: 100 BPM | SYSTOLIC BLOOD PRESSURE: 124 MMHG | DIASTOLIC BLOOD PRESSURE: 74 MMHG | BODY MASS INDEX: 36.26 KG/M2 | HEIGHT: 68.5 IN

## 2023-08-29 DIAGNOSIS — Z51.81 THERAPEUTIC DRUG MONITORING: Primary | ICD-10-CM

## 2023-08-29 DIAGNOSIS — E66.9 OBESITY (BMI 30-39.9): ICD-10-CM

## 2023-08-29 PROCEDURE — 3078F DIAST BP <80 MM HG: CPT | Performed by: INTERNAL MEDICINE

## 2023-08-29 PROCEDURE — 3074F SYST BP LT 130 MM HG: CPT | Performed by: INTERNAL MEDICINE

## 2023-08-29 PROCEDURE — 3008F BODY MASS INDEX DOCD: CPT | Performed by: INTERNAL MEDICINE

## 2023-08-29 PROCEDURE — 99214 OFFICE O/P EST MOD 30 MIN: CPT | Performed by: INTERNAL MEDICINE

## 2023-08-29 RX ORDER — ONDANSETRON 4 MG/1
4 TABLET, FILM COATED ORAL EVERY 8 HOURS PRN
Qty: 20 TABLET | Refills: 0 | Status: SHIPPED | OUTPATIENT
Start: 2023-08-29

## 2023-10-02 ENCOUNTER — PATIENT MESSAGE (OUTPATIENT)
Dept: INTERNAL MEDICINE CLINIC | Facility: CLINIC | Age: 23
End: 2023-10-02

## 2023-10-03 ENCOUNTER — WALK IN (OUTPATIENT)
Dept: URGENT CARE | Age: 23
End: 2023-10-03

## 2023-10-03 VITALS
BODY MASS INDEX: 34.26 KG/M2 | DIASTOLIC BLOOD PRESSURE: 62 MMHG | TEMPERATURE: 97.1 F | WEIGHT: 230 LBS | RESPIRATION RATE: 16 BRPM | HEART RATE: 90 BPM | SYSTOLIC BLOOD PRESSURE: 108 MMHG | OXYGEN SATURATION: 97 %

## 2023-10-03 DIAGNOSIS — J06.9 UPPER RESPIRATORY TRACT INFECTION, UNSPECIFIED TYPE: Primary | ICD-10-CM

## 2023-10-03 DIAGNOSIS — J45.909 STABLE ASTHMA, UNSPECIFIED ASTHMA SEVERITY, UNSPECIFIED WHETHER PERSISTENT: ICD-10-CM

## 2023-10-03 DIAGNOSIS — J32.9 SINUSITIS, UNSPECIFIED CHRONICITY, UNSPECIFIED LOCATION: ICD-10-CM

## 2023-10-03 DIAGNOSIS — J30.9 ALLERGIC RHINITIS, UNSPECIFIED SEASONALITY, UNSPECIFIED TRIGGER: ICD-10-CM

## 2023-10-03 PROCEDURE — 99214 OFFICE O/P EST MOD 30 MIN: CPT | Performed by: FAMILY MEDICINE

## 2023-10-03 PROCEDURE — 87426 SARSCOV CORONAVIRUS AG IA: CPT | Performed by: FAMILY MEDICINE

## 2023-10-03 PROCEDURE — 87880 STREP A ASSAY W/OPTIC: CPT | Performed by: FAMILY MEDICINE

## 2023-10-03 RX ORDER — AMOXICILLIN AND CLAVULANATE POTASSIUM 875; 125 MG/1; MG/1
1 TABLET, FILM COATED ORAL 2 TIMES DAILY
Qty: 20 TABLET | Refills: 0 | Status: SHIPPED | OUTPATIENT
Start: 2023-10-03 | End: 2023-10-13

## 2023-10-03 RX ORDER — ALBUTEROL SULFATE 90 UG/1
AEROSOL, METERED RESPIRATORY (INHALATION)
Qty: 1 EACH | Refills: 0 | Status: SHIPPED | OUTPATIENT
Start: 2023-10-03

## 2023-10-03 RX ORDER — SEMAGLUTIDE 1.7 MG/.75ML
INJECTION, SOLUTION SUBCUTANEOUS
COMMUNITY
Start: 2023-08-29

## 2023-10-03 ASSESSMENT — PAIN SCALES - GENERAL: PAINLEVEL: 3

## 2023-10-03 NOTE — TELEPHONE ENCOUNTER
From: Myesha Zambrano  To: Sandra Zavala  Sent: 10/2/2023 6:20 PM CDT  Subject: Wegovy 2.4    Hi, I finished the 1.7mg dose of wegovy last week. Could you please send the order for the next dose up to my pharmacy so I can pick it for this week?

## 2023-10-03 NOTE — TELEPHONE ENCOUNTER
Requesting wegovy increase  LOV: 8/29/23  RTC: 2 months  Last Relevant Labs: na  Filled: 8//23 #3ml with 2 refills 1.7 mg    Future Appointments   Date Time Provider June Martinez   11/29/2023  1:20 PM William Brock MD [facility]

## 2023-11-29 ENCOUNTER — OFFICE VISIT (OUTPATIENT)
Dept: INTERNAL MEDICINE CLINIC | Facility: CLINIC | Age: 23
End: 2023-11-29
Payer: COMMERCIAL

## 2023-11-29 VITALS
HEIGHT: 68.5 IN | RESPIRATION RATE: 16 BRPM | DIASTOLIC BLOOD PRESSURE: 78 MMHG | SYSTOLIC BLOOD PRESSURE: 118 MMHG | BODY MASS INDEX: 34.91 KG/M2 | HEART RATE: 78 BPM | WEIGHT: 233 LBS

## 2023-11-29 DIAGNOSIS — Z91.199 NON-ADHERENCE TO MEDICAL TREATMENT: ICD-10-CM

## 2023-11-29 DIAGNOSIS — Z51.81 THERAPEUTIC DRUG MONITORING: Primary | ICD-10-CM

## 2023-11-29 DIAGNOSIS — E66.9 OBESITY (BMI 30-39.9): ICD-10-CM

## 2023-11-29 PROCEDURE — 99214 OFFICE O/P EST MOD 30 MIN: CPT | Performed by: INTERNAL MEDICINE

## 2023-11-29 PROCEDURE — 3074F SYST BP LT 130 MM HG: CPT | Performed by: INTERNAL MEDICINE

## 2023-11-29 PROCEDURE — 3008F BODY MASS INDEX DOCD: CPT | Performed by: INTERNAL MEDICINE

## 2023-11-29 PROCEDURE — 3078F DIAST BP <80 MM HG: CPT | Performed by: INTERNAL MEDICINE

## 2023-11-29 RX ORDER — ONDANSETRON 4 MG/1
4 TABLET, FILM COATED ORAL EVERY 8 HOURS PRN
Qty: 20 TABLET | Refills: 0 | Status: SHIPPED | OUTPATIENT
Start: 2023-11-29

## 2023-12-01 ENCOUNTER — E-ADVICE (OUTPATIENT)
Dept: FAMILY MEDICINE | Age: 23
End: 2023-12-01

## 2023-12-03 ENCOUNTER — APPOINTMENT (OUTPATIENT)
Dept: URGENT CARE | Age: 23
End: 2023-12-03

## 2023-12-04 ENCOUNTER — TELEPHONE (OUTPATIENT)
Dept: FAMILY MEDICINE | Age: 23
End: 2023-12-04

## 2023-12-04 DIAGNOSIS — R77.9 ELEVATED BLOOD PROTEIN: ICD-10-CM

## 2023-12-04 DIAGNOSIS — Z00.00 GENERAL MEDICAL EXAM: ICD-10-CM

## 2023-12-04 DIAGNOSIS — R53.83 FATIGUE, UNSPECIFIED TYPE: ICD-10-CM

## 2023-12-05 ENCOUNTER — TELEPHONE (OUTPATIENT)
Dept: OBGYN | Age: 23
End: 2023-12-05

## 2023-12-06 ENCOUNTER — LAB SERVICES (OUTPATIENT)
Dept: LAB | Age: 23
End: 2023-12-06

## 2023-12-06 DIAGNOSIS — R77.9 ELEVATED BLOOD PROTEIN: ICD-10-CM

## 2023-12-06 DIAGNOSIS — R53.83 FATIGUE, UNSPECIFIED TYPE: ICD-10-CM

## 2023-12-06 DIAGNOSIS — Z00.00 GENERAL MEDICAL EXAM: ICD-10-CM

## 2023-12-06 LAB
ALBUMIN SERPL-MCNC: 3.2 G/DL (ref 3.6–5.1)
ALBUMIN/GLOB SERPL: 0.6 {RATIO} (ref 1–2.4)
ALP SERPL-CCNC: 80 UNITS/L (ref 45–117)
ALT SERPL-CCNC: 14 UNITS/L
ANION GAP SERPL CALC-SCNC: 11 MMOL/L (ref 7–19)
AST SERPL-CCNC: 19 UNITS/L
BASOPHILS # BLD: 0 K/MCL (ref 0–0.3)
BASOPHILS NFR BLD: 1 %
BILIRUB SERPL-MCNC: 0.4 MG/DL (ref 0.2–1)
BUN SERPL-MCNC: 17 MG/DL (ref 6–20)
BUN/CREAT SERPL: 23 (ref 7–25)
CALCIUM SERPL-MCNC: 9 MG/DL (ref 8.4–10.2)
CHLORIDE SERPL-SCNC: 100 MMOL/L (ref 97–110)
CHOLEST SERPL-MCNC: 165 MG/DL
CHOLEST/HDLC SERPL: 3.5 {RATIO}
CO2 SERPL-SCNC: 26 MMOL/L (ref 21–32)
CREAT SERPL-MCNC: 0.73 MG/DL (ref 0.51–0.95)
DEPRECATED RDW RBC: 45.1 FL (ref 39–50)
EGFRCR SERPLBLD CKD-EPI 2021: >90 ML/MIN/{1.73_M2}
EOSINOPHIL # BLD: 0 K/MCL (ref 0–0.5)
EOSINOPHIL NFR BLD: 1 %
ERYTHROCYTE [DISTWIDTH] IN BLOOD: 15.3 % (ref 11–15)
FASTING DURATION TIME PATIENT: 8 HOURS (ref 0–999)
FOLATE SERPL-MCNC: 6.7 NG/ML
GLOBULIN SER-MCNC: 5.1 G/DL (ref 2–4)
GLUCOSE SERPL-MCNC: 85 MG/DL (ref 70–99)
HBA1C MFR BLD: 5.2 % (ref 4.5–5.6)
HCT VFR BLD CALC: 36.7 % (ref 36–46.5)
HDLC SERPL-MCNC: 47 MG/DL
HGB BLD-MCNC: 11.6 G/DL (ref 12–15.5)
IMM GRANULOCYTES # BLD AUTO: 0 K/MCL (ref 0–0.2)
IMM GRANULOCYTES # BLD: 0 %
LDLC SERPL CALC-MCNC: 109 MG/DL
LYMPHOCYTES # BLD: 2.1 K/MCL (ref 1–4.8)
LYMPHOCYTES NFR BLD: 35 %
MCH RBC QN AUTO: 25.8 PG (ref 26–34)
MCHC RBC AUTO-ENTMCNC: 31.6 G/DL (ref 32–36.5)
MCV RBC AUTO: 81.6 FL (ref 78–100)
MONOCYTES # BLD: 0.4 K/MCL (ref 0.3–0.9)
MONOCYTES NFR BLD: 7 %
NEUTROPHILS # BLD: 3.5 K/MCL (ref 1.8–7.7)
NEUTROPHILS NFR BLD: 56 %
NONHDLC SERPL-MCNC: 118 MG/DL
NRBC BLD MANUAL-RTO: 0 /100 WBC
PLATELET # BLD AUTO: 326 K/MCL (ref 140–450)
POTASSIUM SERPL-SCNC: 4.1 MMOL/L (ref 3.4–5.1)
PROT SERPL-MCNC: 8.3 G/DL (ref 6.4–8.2)
RBC # BLD: 4.5 MIL/MCL (ref 4–5.2)
SODIUM SERPL-SCNC: 133 MMOL/L (ref 135–145)
TRIGL SERPL-MCNC: 46 MG/DL
TSH SERPL-ACNC: 2.12 MCUNITS/ML (ref 0.35–5)
VIT B12 SERPL-MCNC: 194 PG/ML (ref 211–911)
WBC # BLD: 6.1 K/MCL (ref 4.2–11)

## 2023-12-06 PROCEDURE — 80061 LIPID PANEL: CPT | Performed by: INTERNAL MEDICINE

## 2023-12-06 PROCEDURE — 82607 VITAMIN B-12: CPT | Performed by: CLINICAL MEDICAL LABORATORY

## 2023-12-06 PROCEDURE — 36415 COLL VENOUS BLD VENIPUNCTURE: CPT | Performed by: FAMILY MEDICINE

## 2023-12-06 PROCEDURE — 80053 COMPREHEN METABOLIC PANEL: CPT | Performed by: INTERNAL MEDICINE

## 2023-12-06 PROCEDURE — 83036 HEMOGLOBIN GLYCOSYLATED A1C: CPT | Performed by: CLINICAL MEDICAL LABORATORY

## 2023-12-06 PROCEDURE — 82746 ASSAY OF FOLIC ACID SERUM: CPT | Performed by: CLINICAL MEDICAL LABORATORY

## 2023-12-06 PROCEDURE — 84165 PROTEIN E-PHORESIS SERUM: CPT | Performed by: CLINICAL MEDICAL LABORATORY

## 2023-12-07 ENCOUNTER — TELEPHONE (OUTPATIENT)
Dept: FAMILY MEDICINE | Age: 23
End: 2023-12-07

## 2023-12-07 ENCOUNTER — OFFICE VISIT (OUTPATIENT)
Dept: OBGYN CLINIC | Facility: CLINIC | Age: 23
End: 2023-12-07
Payer: COMMERCIAL

## 2023-12-07 VITALS
SYSTOLIC BLOOD PRESSURE: 108 MMHG | DIASTOLIC BLOOD PRESSURE: 68 MMHG | BODY MASS INDEX: 35 KG/M2 | HEART RATE: 97 BPM | WEIGHT: 235 LBS

## 2023-12-07 DIAGNOSIS — L73.2 HIDRADENITIS SUPPURATIVA: Primary | ICD-10-CM

## 2023-12-07 LAB
ALBUMIN SERPL ELPH-MCNC: 3.6 G/DL (ref 3.5–4.9)
ALPHA 1: 0.4 G/DL (ref 0.2–0.4)
ALPHA2 GLOB SERPL ELPH-MCNC: 1 G/DL (ref 0.5–0.9)
B-GLOBULIN SERPL ELPH-MCNC: 1.3 G/DL (ref 0.7–1.2)
GAMMA GLOB SERPL ELPH-MCNC: 2 G/DL (ref 0.7–1.7)
GLOBULIN SER-MCNC: 4.7 G/DL (ref 2.1–4.2)
PATH INTERP SPEC-IMP: ABNORMAL
PROT SERPL-MCNC: 8.3 G/DL (ref 6.4–8.2)

## 2023-12-07 PROCEDURE — 3078F DIAST BP <80 MM HG: CPT | Performed by: NURSE PRACTITIONER

## 2023-12-07 PROCEDURE — 99213 OFFICE O/P EST LOW 20 MIN: CPT | Performed by: NURSE PRACTITIONER

## 2023-12-07 PROCEDURE — 3074F SYST BP LT 130 MM HG: CPT | Performed by: NURSE PRACTITIONER

## 2023-12-07 RX ORDER — CLINDAMYCIN AND BENZOYL PEROXIDE 10; 50 MG/G; MG/G
1 GEL TOPICAL 2 TIMES DAILY
Qty: 50 G | Refills: 0 | Status: SHIPPED | OUTPATIENT
Start: 2023-12-07 | End: 2023-12-17

## 2023-12-07 RX ORDER — CEPHALEXIN 500 MG/1
500 CAPSULE ORAL 2 TIMES DAILY
Qty: 14 CAPSULE | Refills: 0 | Status: SHIPPED | OUTPATIENT
Start: 2023-12-07 | End: 2023-12-14

## 2023-12-08 ENCOUNTER — LAB SERVICES (OUTPATIENT)
Dept: LAB | Age: 23
End: 2023-12-08

## 2023-12-08 DIAGNOSIS — R77.9 ELEVATED BLOOD PROTEIN: ICD-10-CM

## 2023-12-08 PROCEDURE — 86334 IMMUNOFIX E-PHORESIS SERUM: CPT | Performed by: CLINICAL MEDICAL LABORATORY

## 2023-12-08 PROCEDURE — 83521 IG LIGHT CHAINS FREE EACH: CPT | Performed by: CLINICAL MEDICAL LABORATORY

## 2023-12-08 PROCEDURE — 36415 COLL VENOUS BLD VENIPUNCTURE: CPT | Performed by: FAMILY MEDICINE

## 2023-12-08 PROCEDURE — 82784 ASSAY IGA/IGD/IGG/IGM EACH: CPT | Performed by: CLINICAL MEDICAL LABORATORY

## 2023-12-12 LAB
IGA SERPL-MCNC: 416 MG/DL (ref 70–400)
IGG SERPL-MCNC: 2050 MG/DL (ref 700–1600)
IGM SERPL-MCNC: 142 MG/DL (ref 40–230)
KAPPA LC FREE SER NEPH-MCNC: 3.71 MG/DL (ref 0.33–1.94)
KAPPA LC/LAMBDA SER: 1.75 {RATIO} (ref 0.26–1.65)
LAMBDA LC FREE SER NEPH-MCNC: 2.12 MG/DL (ref 0.57–2.63)
PATH INTERP BLD-IMP: ABNORMAL

## 2023-12-13 ENCOUNTER — TELEPHONE (OUTPATIENT)
Dept: FAMILY MEDICINE | Age: 23
End: 2023-12-13

## 2023-12-14 ENCOUNTER — APPOINTMENT (OUTPATIENT)
Dept: FAMILY MEDICINE | Age: 23
End: 2023-12-14

## 2024-01-14 NOTE — TELEPHONE ENCOUNTER
Requesting   Requested Prescriptions     Pending Prescriptions Disp Refills    WEGOVY 1.7 MG/0.75ML Subcutaneous Solution Auto-injector [Pharmacy Med Name: Wegovy 1.7 Mg/0.75ml Inj Nu] 3 mL 0     Sig: Inject 0.75 mL (1.7 mg total) into the skin once a week.       LOV: 11/29/23  RTC:   Last Relevant Labs:   Filled: 11/29/23 #3ml with 0 refills    Future Appointments   Date Time Provider Department Center   3/14/2024  1:40 PM Airam Muñoz MD [facility]

## 2024-01-15 ENCOUNTER — PATIENT MESSAGE (OUTPATIENT)
Dept: INTERNAL MEDICINE CLINIC | Facility: CLINIC | Age: 24
End: 2024-01-15

## 2024-01-15 RX ORDER — SEMAGLUTIDE 1.7 MG/.75ML
0.75 INJECTION, SOLUTION SUBCUTANEOUS WEEKLY
Qty: 3 ML | Refills: 0 | Status: SHIPPED | OUTPATIENT
Start: 2024-01-15

## 2024-01-15 NOTE — TELEPHONE ENCOUNTER
From: Sherly Zambrano  To: Airam Muñoz  Sent: 1/15/2024 8:39 AM CST  Subject: Wegovy refill    Hello, I sent a request to get a refill of wegovy 1.7 mg. I didn't have enough for my long trip so I'm 3 weeks from the last dose. I tried refilling before I left but insurance wouldn't cover it until the 17th and I left on the 16th. I'm trying to get the dose as soon as possible

## 2024-01-26 ENCOUNTER — TELEPHONE (OUTPATIENT)
Dept: INTERNAL MEDICINE CLINIC | Facility: CLINIC | Age: 24
End: 2024-01-26

## 2024-01-26 NOTE — TELEPHONE ENCOUNTER
Pt PA got denied due to pt BMI need to be more than equal to 30kg/m2.     Pt BMI is 34.91kg/ms so, done resumit the PA with note attach.

## 2024-01-30 NOTE — TELEPHONE ENCOUNTER
I called [facility] as they left a message to call back about Wegovy   They go the request to appeal the denial and needed to verify if patient can tolerate maintenance dose of 1.7 mg or 2.4 mg weekly .  I said yes.  Given to Damien  will be decided and faxed to our office    Case ID [id number]    The initial PA was attempted in epic but could not be done in there  Then it was entered in CMM - but he BMI information was marked wrong  The denial was printed and I asked Chiquita to send the denial with the note of her BMI and the office notes and asked them to reconsider as it was marked incorrectly.

## 2024-02-07 ENCOUNTER — TELEPHONE (OUTPATIENT)
Dept: FAMILY MEDICINE | Age: 24
End: 2024-02-07

## 2024-02-28 NOTE — TELEPHONE ENCOUNTER
I called [facility] to check on this re-determination of denial of wegovy 1.7 mg  Spoke to Shanta  Transferred to [facility]    12/27/23 to 1/29/2025

## 2024-03-02 NOTE — TELEPHONE ENCOUNTER
Requesting   Requested Prescriptions     Pending Prescriptions Disp Refills    ondansetron (ZOFRAN) 4 mg tablet [Pharmacy Med Name: Ondansetron Hydrochloride 4 Mg Tab Nort] 20 tablet 0     Sig: TAKE ONE TABLET BY MOUTH EVERY EIGHT HOURS AS NEEDED FOR NAUSEA       LOV: 11/29/23  RTC:   Last Relevant Labs:   Filled: 11/29/23#20 with 0 refills    Future Appointments   Date Time Provider Department Center   3/14/2024  1:40 PM Airam Muñoz MD [facility]

## 2024-03-03 RX ORDER — ONDANSETRON 4 MG/1
4 TABLET, FILM COATED ORAL EVERY 8 HOURS PRN
Qty: 20 TABLET | Refills: 0 | Status: SHIPPED | OUTPATIENT
Start: 2024-03-03

## 2024-03-07 DIAGNOSIS — E66.9 OBESITY (BMI 30-39.9): Primary | ICD-10-CM

## 2024-03-14 ENCOUNTER — OFFICE VISIT (OUTPATIENT)
Dept: INTERNAL MEDICINE CLINIC | Facility: CLINIC | Age: 24
End: 2024-03-14
Payer: COMMERCIAL

## 2024-03-14 VITALS
SYSTOLIC BLOOD PRESSURE: 111 MMHG | BODY MASS INDEX: 33.41 KG/M2 | HEART RATE: 99 BPM | WEIGHT: 223 LBS | HEIGHT: 68.5 IN | DIASTOLIC BLOOD PRESSURE: 79 MMHG | RESPIRATION RATE: 16 BRPM

## 2024-03-14 DIAGNOSIS — Z51.81 THERAPEUTIC DRUG MONITORING: Primary | ICD-10-CM

## 2024-03-14 DIAGNOSIS — L65.9 HAIR LOSS: ICD-10-CM

## 2024-03-14 DIAGNOSIS — E66.9 OBESITY (BMI 30-39.9): ICD-10-CM

## 2024-03-14 PROCEDURE — 3078F DIAST BP <80 MM HG: CPT | Performed by: INTERNAL MEDICINE

## 2024-03-14 PROCEDURE — 3074F SYST BP LT 130 MM HG: CPT | Performed by: INTERNAL MEDICINE

## 2024-03-14 PROCEDURE — 99214 OFFICE O/P EST MOD 30 MIN: CPT | Performed by: INTERNAL MEDICINE

## 2024-03-14 PROCEDURE — 3008F BODY MASS INDEX DOCD: CPT | Performed by: INTERNAL MEDICINE

## 2024-03-14 NOTE — PROGRESS NOTES
HISTORY OF PRESENT ILLNESS  Chief Complaint   Patient presents with    Weight Check     Down 10        Sherly Zambrano is a 23 year old female here for follow up in medical weight loss program.     Denies chest pain, shortness of breath, dizziness, blurred vision, headache, paresthesia, nausea/vomiting.   Down 10 lb   Fullbody and dumbell workouts   Currently doing twice per week   Would like to incorporate some walkig   Reviewed 24 dietary recall   Currently on 2.4 and less nauseated and less side effects  Does feel fatigue       Wt Readings from Last 6 Encounters:   03/14/24 223 lb (101.2 kg)   12/07/23 235 lb (106.6 kg)   11/29/23 233 lb (105.7 kg)   08/29/23 242 lb (109.8 kg)   05/25/23 245 lb (111.1 kg)   03/16/23 246 lb (111.6 kg)            Breakfast Lunch Dinner Snacks Fluids   Reviewed              REVIEW OF SYSTEMS  GENERAL HEALTH: feels well otherwise, denied any fevers chills or night sweats   RESPIRATORY: denies shortness of breath   CARDIOVASCULAR: denies chest pain  GI: denies abdominal pain    EXAM  /79   Pulse 99   Resp 16   Ht 5' 8.5\" (1.74 m)   Wt 223 lb (101.2 kg)   LMP 03/07/2024 (Exact Date)   BMI 33.41 kg/m²   GENERAL: well developed, well nourished,in no apparent distress, A/O x3  SKIN: no rashes,no suspicious lesions  HEENT: atraumatic, normocephalic, OP-clear, PERRL  NECK: supple,no adenopathy  LUNGS: clear to auscultation bilaterally   CARDIO: RRR without murmur  GI: good BS's,NT/ND, no masses or HSM  EXTREMITIES: no cyanosis, no clubbing, no edema    No results found for: \"WBC\", \"RBC\", \"HGB\", \"HCT\", \"MCV\", \"MCH\", \"MCHC\", \"RDW\", \"PLT\", \"MPV\"  No results found for: \"GLU\", \"BUN\", \"BUNCREA\", \"CREATSERUM\", \"ANIONGAP\", \"GFR\", \"GFRNAA\", \"GFRAA\", \"CA\", \"OSMOCALC\", \"ALKPHO\", \"AST\", \"ALT\", \"ALKPHOS\", \"BILT\", \"TP\", \"ALB\", \"GLOBULIN\", \"AGRATIO\", \"NA\", \"K\", \"CL\", \"CO2\"  No results found for: \"EAG\", \"A1C\"  No results found for: \"CHOLEST\", \"TRIG\", \"HDL\", \"LDL\", \"VLDL\", \"TCHDLRATIO\",  \"NONHDLC\", \"CHOLHDLRATIO\", \"CALCNONHDL\"  No results found for: \"T4F\", \"TSH\", \"TSHT4\"  No results found for: \"B12\", \"VITB12\"  No results found for: \"VITD\", \"QVITD\", \"HUOH87YQ\"    Current Outpatient Medications on File Prior to Visit   Medication Sig Dispense Refill    ondansetron (ZOFRAN) 4 mg tablet Take 1 tablet (4 mg total) by mouth every 8 (eight) hours as needed for Nausea. 20 tablet 0    mometasone 0.1 % External Ointment Apply twice to affected areas on right leg for 2 weeks then stop.      Clindamycin Phosphate 1 % External Gel Apply 1 Application. topically daily. APPLY TO AFFECTED AREA      Cholecalciferol 50 MCG (2000 UT) Oral Cap Take 2,000 Units by mouth daily. Increase to two capsules per day during winter months (4000 IU daily) 30 capsule 0    mupirocin 2 % External Ointment Apply 1 Application topically 3 (three) times daily. 30 g 0    Albuterol Sulfate  (90 Base) MCG/ACT Inhalation Aero Soln Inhale 2 puffs into the lungs every 6 (six) hours as needed. 15 minutes before exercise 18 g 3    rifAMPin 300 MG Oral Cap Take 1 capsule (300 mg total) by mouth 2 (two) times daily. (Patient not taking: Reported on 3/14/2024)       No current facility-administered medications on file prior to visit.       ASSESSMENT  Analyzed weight data:       Diagnoses and all orders for this visit:    Therapeutic drug monitoring    Obesity (BMI 30-39.9)  -     semaglutide-weight management 2.4 MG/0.75ML Subcutaneous Solution Auto-injector; Inject 0.75 mL (2.4 mg total) into the skin once a week for 12 doses.    Hair loss              PLAN  Reconsult weight: 8/31/22 at 252  Down 10 lb   Down 29 lb   Total time spent on chart review, pre-charting, obtaining history, counseling, and educating, reviewing labs was 30 minutes.    Continue wegovy   -advised of side effects and adverse effects of this medication  Noting some hair loss  Reviewed otc treatment options   -advised of side effects and adverse effects of this  medication  Reviewed glp1 medications thyroid and pancreatic history and her personal history of leukemia  Patient would like trial of medication  -denies any personal or family history of pancreatitis, pancreatic cancer, thyroid cancer, MEN2 - discussed MOA, advised side effects and adverse effects of medication.  Nutrition: low carb diet/ recommended to eat breakfast daily/ regular protein intake  Medication use and side effects reviewed with patient.  Medication contraindications: n/a  Follow up with dietitian and psychologist as recommended.  Discussed the role of sleep and stress in weight management.  Counseled on comprehensive weight loss plan including attention to nutrition, exercise and behavior/stress management for success. See patient instruction below for more details.  Discussed strategies to overcome barriers to successful weight loss and weight maintenance  FITTE: ACSM recommendations (150-300 minutes/ week in active weight loss)   Weight Loss consent to treat reviewed and signed n/a    There are no Patient Instructions on file for this visit.    No follow-ups on file.    Patient verbalizes understanding.    Airam Muñoz MD

## 2024-03-27 ENCOUNTER — OFFICE VISIT (OUTPATIENT)
Facility: LOCATION | Age: 24
End: 2024-03-27
Payer: COMMERCIAL

## 2024-03-27 DIAGNOSIS — J35.1 TONSILLAR HYPERTROPHY: ICD-10-CM

## 2024-03-27 DIAGNOSIS — R06.83 SNORING: ICD-10-CM

## 2024-03-27 DIAGNOSIS — H93.13 TINNITUS OF BOTH EARS: Primary | ICD-10-CM

## 2024-03-27 DIAGNOSIS — R09.81 NASAL CONGESTION: ICD-10-CM

## 2024-03-27 PROCEDURE — 99203 OFFICE O/P NEW LOW 30 MIN: CPT | Performed by: STUDENT IN AN ORGANIZED HEALTH CARE EDUCATION/TRAINING PROGRAM

## 2024-03-27 RX ORDER — FLUTICASONE PROPIONATE 50 MCG
1 SPRAY, SUSPENSION (ML) NASAL NIGHTLY
Qty: 16 G | Refills: 3 | Status: SHIPPED | OUTPATIENT
Start: 2024-03-27

## 2024-03-27 RX ORDER — AZELASTINE 1 MG/ML
1 SPRAY, METERED NASAL NIGHTLY
Qty: 30 ML | Refills: 3 | Status: SHIPPED | OUTPATIENT
Start: 2024-03-27

## 2024-03-27 NOTE — PROGRESS NOTES
[facility]  OTOLARYNGOLOGY - HEAD & NECK SURGERY    3/27/2024     Reason for Consultation:   Tinnitus, muffled hearing, snoring    History of Present Illness:   Patient is a pleasant 23 year old female who is being seen for many months of bilateral tinnitus and muffled hearing which comes on episodically.  She has also been having some slight dizziness with this.  She has not had any issues with her hearing outside of these episodes.  She denies any ear pain or ear drainage.  No history of recurrent ear infections.  Secondarily, she has been having snoring at night and she wakes up in the morning with nasal congestion.  She has not tried any nasal sprays for this.  She does take magnesium daily and vitamin D.  She does not take vitamin B complex.  She has not had a sleep study previously.  She does snore and does suffer from fatigue throughout the day.  She is concerned that her tonsils may be causing some obstruction.    Past Medical History  Past Medical History:   Diagnosis Date    History of acute leukemia        Past Surgical History  Past Surgical History:   Procedure Laterality Date    IR PORT A CATHETER REMOVAL      OTHER      PAC placement        Family History  Family History   Problem Relation Age of Onset    Genetic Disease Paternal Grandmother     Depression Paternal Grandmother     Genetic Disease Paternal Grandfather     Depression Paternal Grandfather        Social History  Pediatric History   Patient Parents    Trudy Zambrano (Father)     Other Topics Concern    Caffeine Concern Yes     Comment: 2-3 cups of coffee a day    Exercise No    Seat Belt Not Asked    Special Diet No    Stress Concern Not Asked    Weight Concern Not Asked   Social History Narrative    Not on file           Current Medications:  Current Outpatient Medications   Medication Sig Dispense Refill    semaglutide-weight management 2.4 MG/0.75ML Subcutaneous Solution Auto-injector Inject 0.75 mL (2.4 mg total) into the skin once a week  for 12 doses. 9 mL 0    ondansetron (ZOFRAN) 4 mg tablet Take 1 tablet (4 mg total) by mouth every 8 (eight) hours as needed for Nausea. 20 tablet 0    rifAMPin 300 MG Oral Cap Take 1 capsule (300 mg total) by mouth 2 (two) times daily.      mometasone 0.1 % External Ointment Apply twice to affected areas on right leg for 2 weeks then stop.      Clindamycin Phosphate 1 % External Gel Apply 1 Application. topically daily. APPLY TO AFFECTED AREA      Cholecalciferol 50 MCG (2000 UT) Oral Cap Take 2,000 Units by mouth daily. Increase to two capsules per day during winter months (4000 IU daily) 30 capsule 0    mupirocin 2 % External Ointment Apply 1 Application topically 3 (three) times daily. 30 g 0    Albuterol Sulfate  (90 Base) MCG/ACT Inhalation Aero Soln Inhale 2 puffs into the lungs every 6 (six) hours as needed. 15 minutes before exercise 18 g 3       Allergies  No Known Allergies    Review of Systems:   A comprehensive 10 point review of systems was completed.  Pertinent positives and negatives noted in the the HPI.    Physical Exam:   Last menstrual period 03/07/2024.    GENERAL: No acute distress, Comfortable appearing  FACE: HB 1/6, Normal Animation  HEAD: Normocephalic  EYES: EOMI, pupils equil  EARS: Bilateral Auricles Symmetric  NOSE: Nares patent bilaterally  ORAL CAVITY: Tongue mobile, Oropharynx with enlarged tonsils, Floor of mouth clear, Posterior oropharynx normal  NECK: No palpable lymphadenopathy, thyroid not palpable, nontender    Canals:  Right: Clear  Left: Clear    Tympanic Membranes:  Right: Normal tympanic membrane, with no retraction, middle ear space clear  Left: Normal tympanic membrane. with no retraction middle ear space clear    TM Visualized Method:   Right TM examined via otomicroscopy.   Left TM examined via otomicroscopy.        Results:     Laboratory Data:  No results found for: \"WBC\", \"HGB\", \"HCT\", \"PLT\", \"CREATSERUM\", \"BUN\", \"NA\", \"K\", \"CL\", \"CO2\", \"GLU\", \"CA\", \"ALB\",  \"ALKPHO\", \"TP\", \"AST\", \"ALT\", \"PTT\", \"INR\", \"PTP\", \"T4F\", \"TSH\", \"TSHREFLEX\", \"THOM\", \"LIP\", \"GGT\", \"PSA\", \"DDIMER\", \"ESRML\", \"ESRPF\", \"CRP\", \"BNP\", \"MG\", \"PHOS\", \"TROP\", \"CK\", \"CKMB\", \"ELIZA\", \"RPR\", \"B12\", \"ETOH\", \"POCGLU\"      Imaging:  No results found.      Impression:   Tinnitus, bilateral  Nasal congestion  Snoring  Tonsillar hypertrophy    Recommendations:  Have the patient obtain audiogram due to her tinnitus.  She will add vitamin B complex to her magnesium supplementation  I will also start her on Flonase and azelastine for her nasal congestion to see if this helps with her snoring  If she continues to have snoring and daily fatigue I may have her obtain sleep study  She will return to see me after her hearing test is completed    Thank you for allowing me to participate in the care of your patient.    Dontrell Gardner, DO   Otolaryngology/Rhinology, Sinus, and Endoscopic Skull Base Surgery  [facility] [address] [place] [id number]  Phone [phone]  Fax [phone]  3/27/2024  11:02 AM  3/27/2024

## 2024-04-25 DIAGNOSIS — E66.9 OBESITY (BMI 30-39.9): ICD-10-CM

## 2024-04-26 NOTE — TELEPHONE ENCOUNTER
Requesting   Requested Prescriptions     Pending Prescriptions Disp Refills    semaglutide-weight management 2.4 MG/0.75ML Subcutaneous Solution Auto-injector 9 mL 0     Sig: Inject 0.75 mL (2.4 mg total) into the skin once a week for 12 doses.       LOV: 3/14/24  RTC:   Last Relevant Labs:   Filled: 3/24/24 #9ml with 0 refills    No future appointments.

## 2024-06-20 ENCOUNTER — PATIENT MESSAGE (OUTPATIENT)
Dept: INTERNAL MEDICINE CLINIC | Facility: CLINIC | Age: 24
End: 2024-06-20

## 2024-06-20 NOTE — TELEPHONE ENCOUNTER
Requesting   Requested Prescriptions     Pending Prescriptions Disp Refills    semaglutide-weight management 2.4 MG/0.75ML Subcutaneous Solution Auto-injector 9 mL 0     Sig: Inject 0.75 mL (2.4 mg total) into the skin once a week for 12 doses.       LOV: 3/14/2024  RTC:   Last Relevant Labs:   Filled:3/14/2024  #9ml with 0 refills    No future appointments.

## 2024-06-20 NOTE — TELEPHONE ENCOUNTER
From: Sherly Zambrano  To: Airam Muñoz  Sent: 6/20/2024 1:09 PM CDT  Subject: Wegovy prescription     Hello, due to a change from [facility] which is part of my insurance for medication. I need to order my wegovy from their home delivery service instead of a pharamcy. They need a new prescription to be able to ship me the wegovy. I believe a request was sent over

## 2024-06-20 NOTE — TELEPHONE ENCOUNTER
Requesting wegovy  LOV: 3/14/24  RTC: not noted  Last Relevant Labs: na  Filled: 3/14/24 #9ml with 0 refills    Future Appointments   Date Time Provider Department Center   10/29/2024 12:00 PM Airam Muñoz MD [facility]     Already sent by NG to Dr. Muñoz today

## 2024-10-22 ENCOUNTER — OFFICE VISIT (OUTPATIENT)
Dept: INTERNAL MEDICINE CLINIC | Facility: CLINIC | Age: 24
End: 2024-10-22
Payer: COMMERCIAL

## 2024-10-22 ENCOUNTER — TELEPHONE (OUTPATIENT)
Dept: INTERNAL MEDICINE CLINIC | Facility: CLINIC | Age: 24
End: 2024-10-22

## 2024-10-22 VITALS
WEIGHT: 230 LBS | BODY MASS INDEX: 34.86 KG/M2 | HEIGHT: 68 IN | HEART RATE: 85 BPM | RESPIRATION RATE: 18 BRPM | DIASTOLIC BLOOD PRESSURE: 78 MMHG | SYSTOLIC BLOOD PRESSURE: 120 MMHG

## 2024-10-22 DIAGNOSIS — E66.9 OBESITY (BMI 30-39.9): ICD-10-CM

## 2024-10-22 DIAGNOSIS — Z51.81 THERAPEUTIC DRUG MONITORING: Primary | ICD-10-CM

## 2024-10-22 PROCEDURE — 3078F DIAST BP <80 MM HG: CPT | Performed by: INTERNAL MEDICINE

## 2024-10-22 PROCEDURE — 3008F BODY MASS INDEX DOCD: CPT | Performed by: INTERNAL MEDICINE

## 2024-10-22 PROCEDURE — 99214 OFFICE O/P EST MOD 30 MIN: CPT | Performed by: INTERNAL MEDICINE

## 2024-10-22 PROCEDURE — 3074F SYST BP LT 130 MM HG: CPT | Performed by: INTERNAL MEDICINE

## 2024-10-22 RX ORDER — TIRZEPATIDE 2.5 MG/.5ML
2.5 INJECTION, SOLUTION SUBCUTANEOUS WEEKLY
Qty: 2 ML | Refills: 0 | Status: SHIPPED | OUTPATIENT
Start: 2024-10-22

## 2024-10-22 NOTE — PROGRESS NOTES
HISTORY OF PRESENT ILLNESS  Chief Complaint   Patient presents with    Weight Check     Up 7 pounds        Sherly Zambrano is a 24 year old female here for follow up in medical weight loss program.     Denies chest pain, shortness of breath, dizziness, blurred vision, headache, paresthesia, nausea/vomiting.   UP 7 lb from previous   [place] that that energy really tanked with wegovy.   Got severe headaches and started to feel really sick/ nauseated/ constipation.   Felt sick all the time   Started to take magnesium/ miralax as needed andno longer working on weight loss   Felt that she dipped to 227 lb and was working a job with 2 hour commte that now she can meal prep and do more meals and felt stuck at her weight.   .          Wt Readings from Last 6 Encounters:   10/22/24 230 lb (104.3 kg)   03/14/24 223 lb (101.2 kg)   12/07/23 235 lb (106.6 kg)   11/29/23 233 lb (105.7 kg)   08/29/23 242 lb (109.8 kg)   05/25/23 245 lb (111.1 kg)            Breakfast Lunch Dinner Snacks Fluids   Reviewed              REVIEW OF SYSTEMS  GENERAL HEALTH: feels well otherwise, denied any fevers chills or night sweats   RESPIRATORY: denies shortness of breath   CARDIOVASCULAR: denies chest pain  GI: denies abdominal pain    EXAM  /78   Pulse 85   Resp 18   Ht 5' 8\" (1.727 m)   Wt 230 lb (104.3 kg)   LMP 10/10/2024 (Approximate)   BMI 34.97 kg/m²   GENERAL: well developed, well nourished,in no apparent distress, A/O x3  SKIN: no rashes,no suspicious lesions  HEENT: atraumatic, normocephalic, OP-clear, PERRL  NECK: supple,no adenopathy  LUNGS: clear to auscultation bilaterally   CARDIO: RRR without murmur  GI: good BS's,NT/ND, no masses or HSM  EXTREMITIES: no cyanosis, no clubbing, no edema    No results found for: \"WBC\", \"RBC\", \"HGB\", \"HCT\", \"MCV\", \"MCH\", \"MCHC\", \"RDW\", \"PLT\", \"MPV\"  No results found for: \"GLU\", \"BUN\", \"BUNCREA\", \"CREATSERUM\", \"ANIONGAP\", \"GFR\", \"GFRNAA\", \"GFRAA\", \"CA\", \"OSMOCALC\", \"ALKPHO\", \"AST\", \"ALT\",  \"ALKPHOS\", \"BILT\", \"TP\", \"ALB\", \"GLOBULIN\", \"AGRATIO\", \"NA\", \"K\", \"CL\", \"CO2\"  No results found for: \"EAG\", \"A1C\"  No results found for: \"CHOLEST\", \"TRIG\", \"HDL\", \"LDL\", \"VLDL\", \"TCHDLRATIO\", \"NONHDLC\", \"CHOLHDLRATIO\", \"CALCNONHDL\"  No results found for: \"T4F\", \"TSH\", \"TSHT4\"  No results found for: \"B12\", \"VITB12\"  No results found for: \"VITD\", \"QVITD\", \"DZHO87JF\"    Current Outpatient Medications on File Prior to Visit   Medication Sig Dispense Refill    fluticasone propionate 50 MCG/ACT Nasal Suspension 1 spray by Nasal route at bedtime. 16 g 3    azelastine 0.1 % Nasal Solution 1 spray by Nasal route at bedtime. 30 mL 3    ondansetron (ZOFRAN) 4 mg tablet Take 1 tablet (4 mg total) by mouth every 8 (eight) hours as needed for Nausea. 20 tablet 0    mometasone 0.1 % External Ointment Apply twice to affected areas on right leg for 2 weeks then stop.      Clindamycin Phosphate 1 % External Gel Apply 1 Application. topically daily. APPLY TO AFFECTED AREA      Cholecalciferol 50 MCG (2000 UT) Oral Cap Take 2,000 Units by mouth daily. Increase to two capsules per day during winter months (4000 IU daily) 30 capsule 0    mupirocin 2 % External Ointment Apply 1 Application topically 3 (three) times daily. 30 g 0    Albuterol Sulfate  (90 Base) MCG/ACT Inhalation Aero Soln Inhale 2 puffs into the lungs every 6 (six) hours as needed. 15 minutes before exercise 18 g 3    rifAMPin 300 MG Oral Cap Take 1 capsule (300 mg total) by mouth 2 (two) times daily. (Patient not taking: Reported on 10/22/2024)       No current facility-administered medications on file prior to visit.       ASSESSMENT  Analyzed weight data:       Diagnoses and all orders for this visit:    Therapeutic drug monitoring    Obesity (BMI 30-39.9)    Other orders  -     Tirzepatide-Weight Management (ZEPBOUND) 2.5 MG/0.5ML Subcutaneous Solution Auto-injector; Inject 2.5 mg into the skin once a week.                PLAN  Reconsult weight: 8/31/22 at  252  UP 7 lb   Down 22 lb   Total time spent on chart review, pre-charting, obtaining history, counseling, and educating, reviewing labs was 30 minutes.  Reviewed side effects with wegovy   Trial of zepbound   -advised of side effects and adverse effects of this medication    Hold wegovy   Reviewed otc treatment options   -advised of side effects and adverse effects of this medication  Reviewed glp1 medications thyroid and pancreatic history and her personal history of leukemia  Patient would like trial of medication  -denies any personal or family history of pancreatitis, pancreatic cancer, thyroid cancer, MEN2 - discussed MOA, advised side effects and adverse effects of medication.  Nutrition: low carb diet/ recommended to eat breakfast daily/ regular protein intake  Medication use and side effects reviewed with patient.  Medication contraindications: n/a  Follow up with dietitian and psychologist as recommended.  Discussed the role of sleep and stress in weight management.  Counseled on comprehensive weight loss plan including attention to nutrition, exercise and behavior/stress management for success. See patient instruction below for more details.  Discussed strategies to overcome barriers to successful weight loss and weight maintenance  FITTE: ACSM recommendations (150-300 minutes/ week in active weight loss)   Weight Loss consent to treat reviewed and signed n/a    There are no Patient Instructions on file for this visit.    No follow-ups on file.    Patient verbalizes understanding.    Airam Muñoz MD    Answers submitted by the patient for this visit:  Medical Weight Loss Follow Up (Submitted on 10/22/2024)  If greater than 5/1O how would you grade your coping mechanisms?: fair

## 2024-11-06 ENCOUNTER — E-ADVICE (OUTPATIENT)
Dept: FAMILY MEDICINE | Age: 24
End: 2024-11-06

## 2024-11-13 ENCOUNTER — TELEPHONE (OUTPATIENT)
Dept: FAMILY MEDICINE | Age: 24
End: 2024-11-13

## 2024-11-13 SDOH — ECONOMIC STABILITY: FOOD INSECURITY: WITHIN THE PAST 12 MONTHS, THE FOOD YOU BOUGHT JUST DIDN'T LAST AND YOU DIDN'T HAVE MONEY TO GET MORE.: NEVER TRUE

## 2024-11-13 SDOH — ECONOMIC STABILITY: HOUSING INSECURITY: WHAT IS YOUR LIVING SITUATION TODAY?: I HAVE A STEADY PLACE TO LIVE

## 2024-11-13 SDOH — ECONOMIC STABILITY: HOUSING INSECURITY: DO YOU HAVE PROBLEMS WITH ANY OF THE FOLLOWING?: NONE OF THE ABOVE

## 2024-11-13 SDOH — ECONOMIC STABILITY: TRANSPORTATION INSECURITY
IN THE PAST 12 MONTHS, HAS LACK OF RELIABLE TRANSPORTATION KEPT YOU FROM MEDICAL APPOINTMENTS, MEETINGS, WORK OR FROM GETTING THINGS NEEDED FOR DAILY LIVING?: NO

## 2024-11-13 SDOH — ECONOMIC STABILITY: GENERAL: WOULD YOU LIKE HELP WITH ANY OF THE FOLLOWING NEEDS?: I DON'T WANT HELP WITH ANY OF THESE

## 2024-11-13 ASSESSMENT — SOCIAL DETERMINANTS OF HEALTH (SDOH)
IN THE PAST 12 MONTHS, HAS THE ELECTRIC, GAS, OIL, OR WATER COMPANY THREATENED TO SHUT OFF SERVICE IN YOUR HOME?: NO

## 2024-11-14 ENCOUNTER — APPOINTMENT (OUTPATIENT)
Dept: FAMILY MEDICINE | Age: 24
End: 2024-11-14

## 2024-11-18 DIAGNOSIS — E66.9 OBESITY (BMI 30-39.9): Primary | ICD-10-CM

## 2024-11-18 NOTE — TELEPHONE ENCOUNTER
NS- requesting increased to Zepbound 5mg  -next appt 2/12/25  -no problems with Zepbound 2.5mg dose

## 2024-11-18 NOTE — TELEPHONE ENCOUNTER
Requesting   Requested Prescriptions     Pending Prescriptions Disp Refills    ZEPBOUND 2.5 MG/0.5ML Subcutaneous Solution Auto-injector [Pharmacy Med Name: Zepbound 2.5 Mg/0.5ml Inj Lill] 2 mL 0     Sig: Inject 2.5 mg into the skin once a week.       LOV: 10/22/24  RTC: N/A  Filled: 10/22/24 #2ml with 0 refills    No future appointments.

## 2024-11-19 RX ORDER — TIRZEPATIDE 5 MG/.5ML
5 INJECTION, SOLUTION SUBCUTANEOUS WEEKLY
Qty: 2 ML | Refills: 0 | Status: SHIPPED | OUTPATIENT
Start: 2024-11-19 | End: 2024-12-11

## 2024-11-22 ENCOUNTER — TELEPHONE (OUTPATIENT)
Dept: FAMILY MEDICINE | Age: 24
End: 2024-11-22

## 2024-11-25 ENCOUNTER — APPOINTMENT (OUTPATIENT)
Dept: FAMILY MEDICINE | Age: 24
End: 2024-11-25

## 2024-12-16 ENCOUNTER — APPOINTMENT (OUTPATIENT)
Dept: FAMILY MEDICINE | Age: 24
End: 2024-12-16

## 2024-12-20 ENCOUNTER — PATIENT MESSAGE (OUTPATIENT)
Dept: INTERNAL MEDICINE CLINIC | Facility: CLINIC | Age: 24
End: 2024-12-20

## 2024-12-23 RX ORDER — TIRZEPATIDE 7.5 MG/.5ML
7.5 INJECTION, SOLUTION SUBCUTANEOUS WEEKLY
Qty: 2 ML | Refills: 0 | Status: SHIPPED | OUTPATIENT
Start: 2024-12-23 | End: 2025-01-14

## 2024-12-23 NOTE — TELEPHONE ENCOUNTER
Requesting   Requested Prescriptions     Pending Prescriptions Disp Refills    Tirzepatide-Weight Management (ZEPBOUND) 7.5 MG/0.5ML Subcutaneous Solution Auto-injector 2 mL 0     Sig: Inject 7.5 mg into the skin once a week for 4 doses.      LOV: 10/22/24  RTC: 02/12/25  Filled: 11/19/24 #2ml with 0 refills    Future Appointments   Date Time Provider Department Center   2/12/2025 11:20 AM Airam Muñoz MD [facility]

## 2024-12-27 ENCOUNTER — TELEPHONE (OUTPATIENT)
Dept: FAMILY MEDICINE | Age: 24
End: 2024-12-27

## 2024-12-30 ENCOUNTER — NURSE TRIAGE (OUTPATIENT)
Dept: TELEHEALTH | Age: 24
End: 2024-12-30

## 2024-12-30 ENCOUNTER — APPOINTMENT (OUTPATIENT)
Dept: FAMILY MEDICINE | Age: 24
End: 2024-12-30

## 2024-12-31 ENCOUNTER — OFFICE VISIT (OUTPATIENT)
Dept: FAMILY MEDICINE | Age: 24
End: 2024-12-31

## 2024-12-31 VITALS
SYSTOLIC BLOOD PRESSURE: 111 MMHG | BODY MASS INDEX: 35.61 KG/M2 | RESPIRATION RATE: 16 BRPM | HEART RATE: 83 BPM | DIASTOLIC BLOOD PRESSURE: 74 MMHG | OXYGEN SATURATION: 98 % | HEIGHT: 68 IN | WEIGHT: 235 LBS | TEMPERATURE: 98.6 F

## 2024-12-31 DIAGNOSIS — J45.30 MILD PERSISTENT ASTHMA WITHOUT COMPLICATION (CMD): ICD-10-CM

## 2024-12-31 DIAGNOSIS — Z85.6 HISTORY OF LEUKEMIA: ICD-10-CM

## 2024-12-31 DIAGNOSIS — L73.2 HIDRADENITIS: Primary | ICD-10-CM

## 2024-12-31 PROCEDURE — 99214 OFFICE O/P EST MOD 30 MIN: CPT | Performed by: FAMILY MEDICINE

## 2024-12-31 RX ORDER — CEPHALEXIN 500 MG/1
500 TABLET, FILM COATED ORAL 2 TIMES DAILY
Qty: 20 TABLET | Refills: 0 | Status: SHIPPED | OUTPATIENT
Start: 2024-12-31 | End: 2025-01-10

## 2024-12-31 RX ORDER — FLUTICASONE PROPIONATE AND SALMETEROL 100; 50 UG/1; UG/1
1 POWDER RESPIRATORY (INHALATION)
Qty: 1 EACH | Refills: 1 | Status: SHIPPED | OUTPATIENT
Start: 2024-12-31

## 2024-12-31 RX ORDER — ALBUTEROL SULFATE 90 UG/1
INHALANT RESPIRATORY (INHALATION)
Qty: 1 EACH | Refills: 0 | Status: SHIPPED | OUTPATIENT
Start: 2024-12-31 | End: 2024-12-31 | Stop reason: SDUPTHER

## 2024-12-31 RX ORDER — ALBUTEROL SULFATE 90 UG/1
2 INHALANT RESPIRATORY (INHALATION)
Qty: 1 EACH | Refills: 3 | Status: SHIPPED | OUTPATIENT
Start: 2024-12-31

## 2024-12-31 ASSESSMENT — PATIENT HEALTH QUESTIONNAIRE - PHQ9
2. FEELING DOWN, DEPRESSED OR HOPELESS: NOT AT ALL
1. LITTLE INTEREST OR PLEASURE IN DOING THINGS: NOT AT ALL
CLINICAL INTERPRETATION OF PHQ2 SCORE: NO FURTHER SCREENING NEEDED
SUM OF ALL RESPONSES TO PHQ9 QUESTIONS 1 AND 2: 0
SUM OF ALL RESPONSES TO PHQ9 QUESTIONS 1 AND 2: 0

## 2025-01-02 ENCOUNTER — TELEPHONE (OUTPATIENT)
Dept: HEMATOLOGY/ONCOLOGY | Age: 25
End: 2025-01-02

## 2025-01-13 ENCOUNTER — TELEPHONE (OUTPATIENT)
Dept: FAMILY MEDICINE | Age: 25
End: 2025-01-13

## 2025-01-14 ENCOUNTER — APPOINTMENT (OUTPATIENT)
Dept: FAMILY MEDICINE | Age: 25
End: 2025-01-14

## 2025-02-11 ENCOUNTER — PATIENT MESSAGE (OUTPATIENT)
Dept: INTERNAL MEDICINE CLINIC | Facility: CLINIC | Age: 25
End: 2025-02-11

## 2025-02-11 NOTE — TELEPHONE ENCOUNTER
Requesting zepbound increase  LOV: 10/22/24  RTC: not noted  Last Relevant Labs: na  Filled: 12/23/24 #2ml with 0 refills  7.5 mg dose    Future Appointments   Date Time Provider Department Center   2/12/2025 11:20 AM Airam Muñoz MD [facility]   3/20/2025 10:40 AM Airam Muñoz MD [facility]

## 2025-02-12 ENCOUNTER — TELEMEDICINE (OUTPATIENT)
Dept: INTERNAL MEDICINE CLINIC | Facility: CLINIC | Age: 25
End: 2025-02-12
Payer: COMMERCIAL

## 2025-02-12 DIAGNOSIS — Z51.81 THERAPEUTIC DRUG MONITORING: Primary | ICD-10-CM

## 2025-02-12 DIAGNOSIS — E66.9 OBESITY (BMI 30-39.9): ICD-10-CM

## 2025-02-12 PROBLEM — L73.2 HIDRADENITIS: Status: ACTIVE | Noted: 2024-12-31

## 2025-02-12 PROBLEM — L65.9 LOSS OF HAIR: Status: ACTIVE | Noted: 2025-02-12

## 2025-02-12 PROBLEM — R63.5 WEIGHT GAIN, ABNORMAL: Status: ACTIVE | Noted: 2022-03-22

## 2025-02-12 PROBLEM — H52.10 MYOPIA: Status: ACTIVE | Noted: 2025-02-12

## 2025-02-12 RX ORDER — TIRZEPATIDE 7.5 MG/.5ML
7.5 INJECTION, SOLUTION SUBCUTANEOUS WEEKLY
Qty: 2 ML | Refills: 0 | Status: SHIPPED | OUTPATIENT
Start: 2025-02-12 | End: 2025-02-12

## 2025-02-12 RX ORDER — TIRZEPATIDE 7.5 MG/.5ML
7.5 INJECTION, SOLUTION SUBCUTANEOUS WEEKLY
Qty: 2 ML | Refills: 1 | Status: SHIPPED | OUTPATIENT
Start: 2025-02-12 | End: 2025-03-06

## 2025-02-12 NOTE — PROGRESS NOTES
HISTORY OF PRESENT ILLNESS  Chief Complaint   Patient presents with    Weight Check     Video         Sherly Zambrano is a 24 year old female here for follow up in medical weight loss program.     Denies chest pain, shortness of breath, dizziness, blurred vision, headache, paresthesia, nausea/vomiting.     Started medication in November    Side effects have been much better for her!   Started weight: 238 lb   Current weight 229 Low b12, add b12 injections x 6 months or trial of nascobal   Feels she started losing weight starting at 7.5 mg   Plans to start Ramadan   Reviewed food and eating during Ramadan       [facility] Follow Up    General Information  Nutrition Recall  Breakfast: Eggs Lunch: None   Dinner: Veggie burger Snacks: Coffee latte    Dining Out: 2   Exercise   Patient stated exercises # days/week: 2  Patient stated perceived level of   exertion: 2 Anaerobic Days: 2   Aerobic Days: 2   Patient stated average level of stress: 4  Sleep                     Wt Readings from Last 6 Encounters:   10/22/24 230 lb (104.3 kg)   03/14/24 223 lb (101.2 kg)   12/07/23 235 lb (106.6 kg)   11/29/23 233 lb (105.7 kg)   08/29/23 242 lb (109.8 kg)   05/25/23 245 lb (111.1 kg)            Breakfast Lunch Dinner Snacks Fluids   Reviewed              REVIEW OF SYSTEMS  GENERAL HEALTH: feels well otherwise, denied any fevers chills or night sweats   RESPIRATORY: denies shortness of breath   CARDIOVASCULAR: denies chest pain  GI: denies abdominal pain    EXAM  LMP 10/10/2024 (Approximate)   GENERAL: well developed, well nourished,in no apparent distress, A/O x3  SKIN: no rashes,no suspicious lesions  HEENT: atraumatic, normocephalic, OP-clear, PERRL  NECK: supple,no adenopathy  LUNGS: clear to auscultation bilaterally   CARDIO: RRR without murmur  GI: good BS's,NT/ND, no masses or HSM  EXTREMITIES: no cyanosis, no clubbing, no edema    No results found for: \"WBC\", \"RBC\", \"HGB\", \"HCT\", \"MCV\", \"MCH\", \"MCHC\", \"RDW\", \"PLT\", \"MPV\"  No  results found for: \"GLU\", \"BUN\", \"BUNCREA\", \"CREATSERUM\", \"ANIONGAP\", \"GFR\", \"GFRNAA\", \"GFRAA\", \"CA\", \"OSMOCALC\", \"ALKPHO\", \"AST\", \"ALT\", \"ALKPHOS\", \"BILT\", \"TP\", \"ALB\", \"GLOBULIN\", \"AGRATIO\", \"NA\", \"K\", \"CL\", \"CO2\"  No results found for: \"EAG\", \"A1C\"  No results found for: \"CHOLEST\", \"TRIG\", \"HDL\", \"LDL\", \"VLDL\", \"TCHDLRATIO\", \"NONHDLC\", \"CHOLHDLRATIO\", \"CALCNONHDL\"  No results found for: \"T4F\", \"TSH\", \"TSHT4\"  No results found for: \"B12\", \"VITB12\"  No results found for: \"VITD\", \"QVITD\", \"[id number]\"    Current Outpatient Medications on File Prior to Visit   Medication Sig Dispense Refill    fluticasone propionate 50 MCG/ACT Nasal Suspension 1 spray by Nasal route at bedtime. 16 g 3    azelastine 0.1 % Nasal Solution 1 spray by Nasal route at bedtime. 30 mL 3    ondansetron (ZOFRAN) 4 mg tablet Take 1 tablet (4 mg total) by mouth every 8 (eight) hours as needed for Nausea. 20 tablet 0    rifAMPin 300 MG Oral Cap Take 1 capsule (300 mg total) by mouth 2 (two) times daily. (Patient not taking: Reported on 10/22/2024)      mometasone 0.1 % External Ointment Apply twice to affected areas on right leg for 2 weeks then stop.      Clindamycin Phosphate 1 % External Gel Apply 1 Application. topically daily. APPLY TO AFFECTED AREA      Cholecalciferol 50 MCG (2000 UT) Oral Cap Take 2,000 Units by mouth daily. Increase to two capsules per day during winter months (4000 IU daily) 30 capsule 0    mupirocin 2 % External Ointment Apply 1 Application topically 3 (three) times daily. 30 g 0    Albuterol Sulfate  (90 Base) MCG/ACT Inhalation Aero Soln Inhale 2 puffs into the lungs every 6 (six) hours as needed. 15 minutes before exercise 18 g 3     No current facility-administered medications on file prior to visit.       ASSESSMENT  Analyzed weight data:       Diagnoses and all orders for this visit:    Therapeutic drug monitoring    Obesity (BMI 30-39.9)    Other orders  -     Discontinue: Tirzepatide-Weight Management  (ZEPBOUND) 7.5 MG/0.5ML Subcutaneous Solution Auto-injector; Inject 7.5 mg into the skin once a week for 4 doses.  -     Tirzepatide-Weight Management (ZEPBOUND) 7.5 MG/0.5ML Subcutaneous Solution Auto-injector; Inject 7.5 mg into the skin once a week for 4 doses.                  PLAN  Reconsult weight: 8/31/22 at 252  Down to 229 lb     Down 22 lb   Total time spent on chart review, pre-charting, obtaining history, counseling, and educating, reviewing labs was 30 minutes.  Reviewed side effects with wegovy   Continue zepbound   Reviewed medication and fasting   Reviewed food choices and fasting   Consider med changes after Ramadan completed   Watch for low sugars    Hold wegovy   Reviewed otc treatment options   -advised of side effects and adverse effects of this medication  Reviewed glp1 medications thyroid and pancreatic history and her personal history of leukemia  Patient would like trial of medication  -denies any personal or family history of pancreatitis, pancreatic cancer, thyroid cancer, MEN2 - discussed MOA, advised side effects and adverse effects of medication.  Nutrition: low carb diet/ recommended to eat breakfast daily/ regular protein intake  Medication use and side effects reviewed with patient.  Medication contraindications: n/a  Follow up with dietitian and psychologist as recommended.  Discussed the role of sleep and stress in weight management.  Counseled on comprehensive weight loss plan including attention to nutrition, exercise and behavior/stress management for success. See patient instruction below for more details.  Discussed strategies to overcome barriers to successful weight loss and weight maintenance  FITTE: ACSM recommendations (150-300 minutes/ week in active weight loss)   Weight Loss consent to treat reviewed and signed n/a    There are no Patient Instructions on file for this visit.    No follow-ups on file.    Patient verbalizes understanding.    Airam Muñoz MD    Answers  submitted by the patient for this visit:  Medical Weight Loss Follow Up (Submitted on 10/22/2024)  If greater than 5/1O how would you grade your coping mechanisms?: fair

## 2025-03-26 NOTE — PROGRESS NOTES
HISTORY OF PRESENT ILLNESS  Chief Complaint   Patient presents with    Weight Check     Off zepbound for about 3 weeks  Up 4lbs         Sherly Zambrano is a 24 year old female here for follow up in medical weight loss program.   [facility] Follow Up    General Information  Nutrition Recall  Breakfast: Over night oats and eggs on tortilla     Snacks: Rice and chicken samanta   Fluids: Water and tea    Exercise   Patient stated average level of stress: 6  Sleep   Patient stated # hours uninterrupted sleep: 5   Patient stated feels   restful: No                 History of Present Illness  Sherly Zambrano is a 24 year old female who presents for medication management and lifestyle adjustments.    She has been off Zepbound due to a prior authorization issue with her insurance, which required her to join a program. She was unaware that her medication would not be covered unless she joined this program. Personal issues over the past week and a half have delayed her ability to address this matter. During this time, she has been barely eating and could barely focus.    Previously, she was on a 7.5 mg dose of zepbound and experienced increased acid reflux as a side effect, though it was not as severe as her experience with Wegovy, which caused vomiting. When she increased her dose, she experienced body aches, describing it as feeling like everything was like a bruise.    She plans to resume her exercise routine after Ramadan, aiming to incorporate daily walks of three to four miles and to focus on healthier eating habits. She has been eating healthier breakfasts during Ramadan and intends to continue this pattern post-Ramadan.    She is dealing with personal stress related to wedding planning, which is currently uncertain due to relationship issues. Her fiancé has been experiencing trust issues, leading to significant stress and uncertainty about the future of her relationship.    No severe side effects from zepbound other than  increased acid reflux. No headaches or other common side effects associated with the medication.    Wt Readings from Last 6 Encounters:   03/27/25 234 lb 4 oz (106.3 kg)   10/22/24 230 lb (104.3 kg)   03/14/24 223 lb (101.2 kg)   12/07/23 235 lb (106.6 kg)   11/29/23 233 lb (105.7 kg)   08/29/23 242 lb (109.8 kg)              Breakfast Lunch Dinner Snacks Fluids   Reviewed           REVIEW OF SYSTEMS  GENERAL HEALTH: feels well otherwise, denied any fevers chills or night sweats   RESPIRATORY: denies shortness of breath   CARDIOVASCULAR: denies chest pain  GI: denies abdominal pain    EXAM  /80   Pulse 98   Resp 20   Ht 5' 8\" (1.727 m)   Wt 234 lb 4 oz (106.3 kg)   LMP 10/10/2024 (Approximate)   SpO2 100%   BMI 35.62 kg/m²   GENERAL: well developed, well nourished,in no apparent distress, A/O x3  SKIN: no rashes,no suspicious lesions  HEENT: atraumatic, normocephalic, OP-clear, PERRL  NECK: supple,no adenopathy  LUNGS: clear to auscultation bilaterally   CARDIO: RRR without murmur  GI: good BS's,NT/ND, no masses or HSM  EXTREMITIES: no cyanosis, no clubbing, no edema    No results found for: \"WBC\", \"RBC\", \"HGB\", \"HCT\", \"MCV\", \"MCH\", \"MCHC\", \"RDW\", \"PLT\", \"MPV\"  No results found for: \"GLU\", \"BUN\", \"BUNCREA\", \"CREATSERUM\", \"ANIONGAP\", \"GFR\", \"GFRNAA\", \"GFRAA\", \"CA\", \"OSMOCALC\", \"ALKPHO\", \"AST\", \"ALT\", \"ALKPHOS\", \"BILT\", \"TP\", \"ALB\", \"GLOBULIN\", \"AGRATIO\", \"NA\", \"K\", \"CL\", \"CO2\"  No results found for: \"EAG\", \"A1C\"  No results found for: \"CHOLEST\", \"TRIG\", \"HDL\", \"LDL\", \"VLDL\", \"TCHDLRATIO\", \"NONHDLC\", \"CHOLHDLRATIO\", \"CALCNONHDL\"  No results found for: \"T4F\", \"TSH\", \"TSHT4\"  No results found for: \"B12\", \"VITB12\"  No results found for: \"VITD\", \"QVITD\", \"[id number]\"    Medications Ordered Prior to Encounter[1]    ASSESSMENT  Analyzed weight data:  Weight Calculations  Today's Weight: 234 lbs  Title    [facility] Information  Patient type: Non-Surgical        Raw Weight: 3748          Date of Initial Weight:  [id number]       Initial Weight: 250  Today's Weight: 234     Weightloss to Date: 16  Weightloss Percentage: 6.4          Have any comorbidities improved since the last visit?:        Hypertension DM 2 CAD Dyslipidemia Osteoarthritis Sleep Apnea              Diagnoses and all orders for this visit:    Therapeutic drug monitoring    Obesity (BMI 30-39.9)    Other orders  -     Tirzepatide-Weight Management (ZEPBOUND) 5 MG/0.5ML Subcutaneous Solution Auto-injector; Inject 5 mg into the skin once a week.        PLAN  Assessment & Plan  Obesity  Total time spent on chart review, pre-charting, obtaining history, counseling, and educating, reviewing labs was 30 minutes.    Off zepbound due to insurance and personal issues. Experienced side effects with 7.5 mg zepbound and Wegovy. Plan to restart Zepbound at 5 mg. Focus on lifestyle changes post-Ramadan. Goal: 10% weight loss over six months. Insurance will audit progress in 3-6 months.  - Prescribe zepbound  5 mg, send to [facility].  - Instruct to message nurse for 7.5 mg prescription in a week.  - Encourage lifestyle changes post-Ramadan: exercise, healthier eating.  - Set 10% weight loss goal over six months.  - Schedule follow-up with nurse practitioner in 2-3 months, provider in 6 months.        There are no Patient Instructions on file for this visit.    No follow-ups on file.    Patient verbalizes understanding.    Airam Muñoz MD  \         [1]   Current Outpatient Medications on File Prior to Visit   Medication Sig Dispense Refill    fluticasone propionate 50 MCG/ACT Nasal Suspension 1 spray by Nasal route at bedtime. 16 g 3    azelastine 0.1 % Nasal Solution 1 spray by Nasal route at bedtime. 30 mL 3    ondansetron (ZOFRAN) 4 mg tablet Take 1 tablet (4 mg total) by mouth every 8 (eight) hours as needed for Nausea. 20 tablet 0    mometasone 0.1 % External Ointment Apply twice to affected areas on right leg for 2 weeks then stop.      Clindamycin Phosphate 1 % External  Gel Apply 1 Application. topically daily. APPLY TO AFFECTED AREA      Cholecalciferol 50 MCG (2000 UT) Oral Cap Take 2,000 Units by mouth daily. Increase to two capsules per day during winter months (4000 IU daily) 30 capsule 0    mupirocin 2 % External Ointment Apply 1 Application topically 3 (three) times daily. 30 g 0    Albuterol Sulfate  (90 Base) MCG/ACT Inhalation Aero Soln Inhale 2 puffs into the lungs every 6 (six) hours as needed. 15 minutes before exercise 18 g 3     No current facility-administered medications on file prior to visit.

## 2025-03-27 ENCOUNTER — OFFICE VISIT (OUTPATIENT)
Dept: INTERNAL MEDICINE CLINIC | Facility: CLINIC | Age: 25
End: 2025-03-27
Payer: COMMERCIAL

## 2025-03-27 ENCOUNTER — TELEPHONE (OUTPATIENT)
Dept: INTERNAL MEDICINE CLINIC | Facility: CLINIC | Age: 25
End: 2025-03-27

## 2025-03-27 VITALS
BODY MASS INDEX: 35.5 KG/M2 | HEIGHT: 68 IN | RESPIRATION RATE: 20 BRPM | OXYGEN SATURATION: 100 % | HEART RATE: 98 BPM | WEIGHT: 234.25 LBS | SYSTOLIC BLOOD PRESSURE: 108 MMHG | DIASTOLIC BLOOD PRESSURE: 80 MMHG

## 2025-03-27 DIAGNOSIS — E66.9 OBESITY (BMI 30-39.9): ICD-10-CM

## 2025-03-27 DIAGNOSIS — Z51.81 THERAPEUTIC DRUG MONITORING: Primary | ICD-10-CM

## 2025-03-27 PROCEDURE — 3079F DIAST BP 80-89 MM HG: CPT | Performed by: INTERNAL MEDICINE

## 2025-03-27 PROCEDURE — 99213 OFFICE O/P EST LOW 20 MIN: CPT | Performed by: INTERNAL MEDICINE

## 2025-03-27 PROCEDURE — 3008F BODY MASS INDEX DOCD: CPT | Performed by: INTERNAL MEDICINE

## 2025-03-27 PROCEDURE — 3074F SYST BP LT 130 MM HG: CPT | Performed by: INTERNAL MEDICINE

## 2025-03-27 RX ORDER — TIRZEPATIDE 5 MG/.5ML
5 INJECTION, SOLUTION SUBCUTANEOUS WEEKLY
Qty: 2 ML | Refills: 0 | Status: SHIPPED | OUTPATIENT
Start: 2025-03-27

## 2025-03-27 NOTE — PROGRESS NOTES
The following individual(s) verbally consented to be recorded using ambient AI listening technology and understand that they can each withdraw their consent to this listening technology at any point by asking the clinician to turn off or pause the recording:    Patient name: Sherly Zambrano  Additional names:  n/a  Answers submitted by the patient for this visit:  Medical Weight Loss Follow Up (Submitted on 3/27/2025)  If greater than 5/1O how would you grade your coping mechanisms?: fair     ----- Message from Ariella Limon sent at 10/5/2021  1:02 PM CDT -----  Regarding: Need to schedule an appt  Hi  I called on [facility] to reschedule my appointment. I was told I would get a call back because they were working on the schedule. I really need to get this appointment made for the [place] office.    I am tired of buying over the counter omeprazole at $1 a pill. My insurance has free prescriptions and I really need a new prescription written plus probably a scope scheduled to find out why I am having issues 3 years out from Gastric Bypass surgery.   Please call me to schedule an appointment.   Thank you.    Pt mom mohit called back at 4:54pm with concerns of not knowing what to do about the cough not improving.  Mom was advised to make a f/u appt, she scheduled it for tomorrow afternoon at 4pm with dr. Yanira Ruiz.

## 2025-04-02 ENCOUNTER — TELEPHONE (OUTPATIENT)
Dept: FAMILY MEDICINE | Age: 25
End: 2025-04-02

## 2025-04-12 ENCOUNTER — WALK IN (OUTPATIENT)
Dept: URGENT CARE | Age: 25
End: 2025-04-12

## 2025-04-12 VITALS
TEMPERATURE: 98.7 F | RESPIRATION RATE: 16 BRPM | DIASTOLIC BLOOD PRESSURE: 63 MMHG | SYSTOLIC BLOOD PRESSURE: 109 MMHG | HEART RATE: 98 BPM | OXYGEN SATURATION: 96 %

## 2025-04-12 DIAGNOSIS — J01.90 ACUTE NON-RECURRENT SINUSITIS, UNSPECIFIED LOCATION: Primary | ICD-10-CM

## 2025-04-12 RX ORDER — AMOXICILLIN 875 MG/1
875 TABLET, COATED ORAL 2 TIMES DAILY
Qty: 20 TABLET | Refills: 0 | Status: SHIPPED | OUTPATIENT
Start: 2025-04-12 | End: 2025-04-22

## 2025-04-12 RX ORDER — TIRZEPATIDE 5 MG/.5ML
5 INJECTION, SOLUTION SUBCUTANEOUS
COMMUNITY
Start: 2025-03-27

## 2025-04-12 ASSESSMENT — PAIN SCALES - GENERAL: PAINLEVEL_OUTOF10: 4

## 2025-06-25 ENCOUNTER — OFFICE VISIT (OUTPATIENT)
Dept: FAMILY MEDICINE | Age: 25
End: 2025-06-25

## 2025-06-25 VITALS
SYSTOLIC BLOOD PRESSURE: 110 MMHG | HEART RATE: 92 BPM | HEIGHT: 69 IN | RESPIRATION RATE: 16 BRPM | BODY MASS INDEX: 35.73 KG/M2 | DIASTOLIC BLOOD PRESSURE: 70 MMHG | TEMPERATURE: 98.4 F | WEIGHT: 241.2 LBS | OXYGEN SATURATION: 98 %

## 2025-06-25 DIAGNOSIS — L73.2 HIDRADENITIS: ICD-10-CM

## 2025-06-25 DIAGNOSIS — C91.01 ACUTE LYMPHOBLASTIC LEUKEMIA (ALL) IN REMISSION  (CMD): ICD-10-CM

## 2025-06-25 DIAGNOSIS — J45.20 MILD INTERMITTENT ASTHMA WITHOUT COMPLICATION (CMD): ICD-10-CM

## 2025-06-25 DIAGNOSIS — Z71.84 COUNSELING FOR TRAVEL: Primary | ICD-10-CM

## 2025-06-25 PROBLEM — T45.1X5A: Status: RESOLVED | Noted: 2019-09-24 | Resolved: 2025-06-25

## 2025-06-25 PROBLEM — Z51.81 THERAPEUTIC DRUG MONITORING: Status: ACTIVE | Noted: 2025-06-25

## 2025-06-25 PROBLEM — I42.7: Status: RESOLVED | Noted: 2019-09-24 | Resolved: 2025-06-25

## 2025-06-25 PROCEDURE — 99214 OFFICE O/P EST MOD 30 MIN: CPT | Performed by: FAMILY MEDICINE

## 2025-06-25 RX ORDER — ATOVAQUONE AND PROGUANIL HYDROCHLORIDE 250; 100 MG/1; MG/1
1 TABLET, FILM COATED ORAL DAILY
Qty: 50 TABLET | Refills: 0 | Status: CANCELLED | OUTPATIENT
Start: 2025-06-25

## 2025-06-25 RX ORDER — ALBUTEROL SULFATE 90 UG/1
2 INHALANT RESPIRATORY (INHALATION)
Qty: 1 EACH | Refills: 3 | Status: SHIPPED | OUTPATIENT
Start: 2025-06-25

## 2025-06-25 RX ORDER — MEFLOQUINE HYDROCHLORIDE 250 MG/1
250 TABLET ORAL
Qty: 10 TABLET | Refills: 0 | Status: SHIPPED | OUTPATIENT
Start: 2025-06-25

## 2025-06-25 RX ORDER — MUPIROCIN 2 %
OINTMENT (GRAM) TOPICAL 3 TIMES DAILY
Qty: 22 G | Refills: 1 | Status: SHIPPED | OUTPATIENT
Start: 2025-06-25

## 2025-06-25 RX ORDER — AZITHROMYCIN 500 MG/1
500 TABLET, FILM COATED ORAL DAILY
Qty: 3 TABLET | Refills: 0 | Status: SHIPPED | OUTPATIENT
Start: 2025-06-25 | End: 2025-06-28

## 2025-06-25 ASSESSMENT — ENCOUNTER SYMPTOMS
SHORTNESS OF BREATH: 0
DIZZINESS: 0
CHILLS: 0
ABDOMINAL PAIN: 0
HEADACHES: 0
FEVER: 0

## 2025-06-25 ASSESSMENT — PATIENT HEALTH QUESTIONNAIRE - PHQ9
SUM OF ALL RESPONSES TO PHQ9 QUESTIONS 1 AND 2: 0
2. FEELING DOWN, DEPRESSED OR HOPELESS: NOT AT ALL
SUM OF ALL RESPONSES TO PHQ9 QUESTIONS 1 AND 2: 0
1. LITTLE INTEREST OR PLEASURE IN DOING THINGS: NOT AT ALL
CLINICAL INTERPRETATION OF PHQ2 SCORE: NO FURTHER SCREENING NEEDED

## 2025-06-25 ASSESSMENT — PAIN SCALES - GENERAL: PAINLEVEL_OUTOF10: 0

## 2025-06-30 ENCOUNTER — OFFICE VISIT (OUTPATIENT)
Dept: INTERNAL MEDICINE CLINIC | Facility: CLINIC | Age: 25
End: 2025-06-30
Payer: COMMERCIAL

## 2025-06-30 VITALS
HEIGHT: 68 IN | DIASTOLIC BLOOD PRESSURE: 72 MMHG | OXYGEN SATURATION: 98 % | RESPIRATION RATE: 18 BRPM | WEIGHT: 240 LBS | HEART RATE: 92 BPM | SYSTOLIC BLOOD PRESSURE: 100 MMHG | BODY MASS INDEX: 36.37 KG/M2

## 2025-06-30 DIAGNOSIS — L73.2 HIDRADENITIS SUPPURATIVA: ICD-10-CM

## 2025-06-30 DIAGNOSIS — Z51.81 THERAPEUTIC DRUG MONITORING: Primary | ICD-10-CM

## 2025-06-30 DIAGNOSIS — E55.9 VITAMIN D DEFICIENCY: ICD-10-CM

## 2025-06-30 DIAGNOSIS — E66.9 OBESITY (BMI 30-39.9): ICD-10-CM

## 2025-06-30 PROBLEM — J45.20 MILD INTERMITTENT ASTHMA WITHOUT COMPLICATION (HCC): Status: ACTIVE | Noted: 2022-03-22

## 2025-06-30 PROCEDURE — 99214 OFFICE O/P EST MOD 30 MIN: CPT | Performed by: NURSE PRACTITIONER

## 2025-06-30 PROCEDURE — 3078F DIAST BP <80 MM HG: CPT | Performed by: NURSE PRACTITIONER

## 2025-06-30 PROCEDURE — 3074F SYST BP LT 130 MM HG: CPT | Performed by: NURSE PRACTITIONER

## 2025-06-30 PROCEDURE — 3008F BODY MASS INDEX DOCD: CPT | Performed by: NURSE PRACTITIONER

## 2025-06-30 RX ORDER — TIRZEPATIDE 7.5 MG/.5ML
INJECTION, SOLUTION SUBCUTANEOUS
COMMUNITY
Start: 2025-05-17 | End: 2025-06-30

## 2025-06-30 RX ORDER — TIRZEPATIDE 10 MG/.5ML
10 INJECTION, SOLUTION SUBCUTANEOUS WEEKLY
Qty: 2 ML | Refills: 3 | Status: SHIPPED | OUTPATIENT
Start: 2025-06-30 | End: 2025-07-22

## 2025-06-30 RX ORDER — MEFLOQUINE HYDROCHLORIDE 250 MG/1
250 TABLET ORAL
COMMUNITY
Start: 2025-06-25

## 2025-06-30 NOTE — PROGRESS NOTES
HISTORY OF PRESENT ILLNESS  Chief Complaint   Patient presents with    Weight Check     Up 6#     Sherly KEITH Zambrano is a 24 year old female here for follow up in medical weight loss program.     Pt has been taking Zepbound 7.5mg weekly.  Pt states she has not had any symptoms, pt also concerned she hasn't lost any weight at 7.5mg dose.  Pt would like to increase dose.    Eggs and avocado - breakfast  Skips lunch or smoothie/protein shake  Dinner - whatever mom cooks - tries eat more meat and less rice/bread    Enjoys going on a lot of walks    [facility] Follow Up    General Information  Nutrition Recall  Exercise     Sleep              Wt Readings from Last 6 Encounters:   06/30/25 240 lb (108.9 kg)   03/27/25 234 lb 4 oz (106.3 kg)   10/22/24 230 lb (104.3 kg)   03/14/24 223 lb (101.2 kg)   12/07/23 235 lb (106.6 kg)   11/29/23 233 lb (105.7 kg)     Breakfast Lunch Dinner Snacks Fluids   Reviewed         REVIEW OF SYSTEMS  GENERAL HEALTH: feels well otherwise, denied any fevers chills or night sweats   RESPIRATORY: denies shortness of breath   CARDIOVASCULAR: denies chest pain  GI: denies abdominal pain    EXAM  /72   Pulse 92   Resp 18   Ht 5' 8\" (1.727 m)   Wt 240 lb (108.9 kg)   LMP 06/30/2025 (Approximate)   SpO2 98%   BMI 36.49 kg/m²   GENERAL: well developed, well nourished,in no apparent distress, A/O x3  SKIN: no rashes,no suspicious lesions  HEENT: atraumatic, normocephalic, OP-clear, PERRL  NECK: supple,no adenopathy  LUNGS: clear to auscultation bilaterally   CARDIO: RRR without murmur  GI: good BS's,NT/ND, no masses or HSM  EXTREMITIES: no cyanosis, no clubbing, no edema    No results found for: \"WBC\", \"RBC\", \"HGB\", \"HCT\", \"MCV\", \"MCH\", \"MCHC\", \"RDW\", \"PLT\", \"MPV\"  No results found for: \"GLU\", \"BUN\", \"BUNCREA\", \"CREATSERUM\", \"ANIONGAP\", \"GFR\", \"GFRNAA\", \"GFRAA\", \"CA\", \"OSMOCALC\", \"ALKPHO\", \"AST\", \"ALT\", \"ALKPHOS\", \"BILT\", \"TP\", \"ALB\", \"GLOBULIN\", \"AGRATIO\", \"NA\", \"K\", \"CL\", \"CO2\"  No results found  for: \"EAG\", \"A1C\"  No results found for: \"CHOLEST\", \"TRIG\", \"HDL\", \"LDL\", \"VLDL\", \"TCHDLRATIO\", \"NONHDLC\", \"CHOLHDLRATIO\", \"CALCNONHDL\"  No results found for: \"T4F\", \"TSH\", \"TSHT4\"  No results found for: \"B12\", \"VITB12\"  No results found for: \"VITD\", \"QVITD\", \"[id number]\"    Medications Ordered Prior to Encounter[1]    ASSESSMENT  Analyzed weight data:     [facility] Information  Patient type: Non-Surgical   Date of Initial Weight: [id number] Initial Weight: 250 Today's Weight: 240   Weightloss to Date: 10   Weightloss Percentage: 4 5% Goal: 12.5 10% Goal: 25    Initial BMI: 37.46 Today's BMI: 36.49 Change in BMI: 0.97       Have any comorbidities improved since the last visit?   Hypertension DM 2 Dyslipidemia Osteoarthritis Sleep Apnea               Any side complications or side effects from obesity medicine treatment?:   No            Diagnoses and all orders for this visit:    Therapeutic drug monitoring  -     Tirzepatide-Weight Management (ZEPBOUND) 10 MG/0.5ML Subcutaneous Solution Auto-injector; Inject 10 mg into the skin once a week for 4 doses.    Vitamin D deficiency  -     Tirzepatide-Weight Management (ZEPBOUND) 10 MG/0.5ML Subcutaneous Solution Auto-injector; Inject 10 mg into the skin once a week for 4 doses.    Obesity (BMI 30-39.9)  -     Tirzepatide-Weight Management (ZEPBOUND) 10 MG/0.5ML Subcutaneous Solution Auto-injector; Inject 10 mg into the skin once a week for 4 doses.    Hidradenitis suppurativa  -     Tirzepatide-Weight Management (ZEPBOUND) 10 MG/0.5ML Subcutaneous Solution Auto-injector; Inject 10 mg into the skin once a week for 4 doses.      PLAN  Assessment & Plan  Obesity  - original visit: 8/31/2022 - baseline BMI: 38.32 - heaviest weight: 252lbs  - pt wants to increase to Zepbound 10mg weekly  - pt going out of town for a month, will start up new exercise regimen afterwards  - pt to extend shot to inject every 10-12 days if out of town and zepbound 10mg feels too strong  - pt has been on  wegovy 2.4mg shots in the past and only had success with high dose    Vitamin D deficiency  - pt taking supplement, going on vacation for one month  - consider rechecking labs    Total time spent on chart review, pre-charting, obtaining history, counseling, and educating, reviewing labs was 30 minutes.    There are no Patient Instructions on file for this visit.    No follow-ups on file.    Patient verbalizes understanding.    Aileen Varghese, DNP, FNP-BC          [1]   Current Outpatient Medications on File Prior to Visit   Medication Sig Dispense Refill    mefloquine 250 MG Oral Tab Take 1 tablet (250 mg total) by mouth every 7 days.      fluticasone propionate 50 MCG/ACT Nasal Suspension 1 spray by Nasal route at bedtime. 16 g 3    azelastine 0.1 % Nasal Solution 1 spray by Nasal route at bedtime. 30 mL 3    ondansetron (ZOFRAN) 4 mg tablet Take 1 tablet (4 mg total) by mouth every 8 (eight) hours as needed for Nausea. 20 tablet 0    mometasone 0.1 % External Ointment Apply twice to affected areas on right leg for 2 weeks then stop.      Clindamycin Phosphate 1 % External Gel Apply 1 Application. topically daily. APPLY TO AFFECTED AREA      Cholecalciferol 50 MCG (2000 UT) Oral Cap Take 2,000 Units by mouth daily. Increase to two capsules per day during winter months (4000 IU daily) 30 capsule 0    mupirocin 2 % External Ointment Apply 1 Application topically 3 (three) times daily. 30 g 0    Albuterol Sulfate  (90 Base) MCG/ACT Inhalation Aero Soln Inhale 2 puffs into the lungs every 6 (six) hours as needed. 15 minutes before exercise 18 g 3     No current facility-administered medications on file prior to visit.

## 2025-07-02 ENCOUNTER — E-ADVICE (OUTPATIENT)
Dept: FAMILY MEDICINE | Age: 25
End: 2025-07-02

## 2025-08-20 ENCOUNTER — TELEPHONE (OUTPATIENT)
Dept: INTERNAL MEDICINE CLINIC | Facility: CLINIC | Age: 25
End: 2025-08-20